# Patient Record
Sex: FEMALE | Race: WHITE | NOT HISPANIC OR LATINO | Employment: OTHER | URBAN - METROPOLITAN AREA
[De-identification: names, ages, dates, MRNs, and addresses within clinical notes are randomized per-mention and may not be internally consistent; named-entity substitution may affect disease eponyms.]

---

## 2017-03-16 RX ORDER — CHOLESTYRAMINE 4 G/5.5G
POWDER, FOR SUSPENSION ORAL
Qty: 30 PACKET | Refills: 1 | Status: SHIPPED | OUTPATIENT
Start: 2017-03-16 | End: 2017-05-17 | Stop reason: SDUPTHER

## 2017-03-16 RX ORDER — DICYCLOMINE HYDROCHLORIDE 10 MG/1
CAPSULE ORAL
Qty: 90 CAPSULE | Refills: 1 | Status: SHIPPED | OUTPATIENT
Start: 2017-03-16 | End: 2017-05-17 | Stop reason: SDUPTHER

## 2017-05-11 RX ORDER — VENLAFAXINE HYDROCHLORIDE 75 MG/1
CAPSULE, EXTENDED RELEASE ORAL
Qty: 90 CAPSULE | Refills: 0 | Status: SHIPPED | OUTPATIENT
Start: 2017-05-11 | End: 2017-08-12 | Stop reason: SDUPTHER

## 2017-05-15 ENCOUNTER — PATIENT OUTREACH (OUTPATIENT)
Dept: ADMINISTRATIVE | Facility: HOSPITAL | Age: 62
End: 2017-05-15
Payer: COMMERCIAL

## 2017-05-15 DIAGNOSIS — Z12.39 BREAST CANCER SCREENING: Primary | ICD-10-CM

## 2017-05-17 ENCOUNTER — OFFICE VISIT (OUTPATIENT)
Dept: FAMILY MEDICINE | Facility: CLINIC | Age: 62
End: 2017-05-17
Payer: COMMERCIAL

## 2017-05-17 ENCOUNTER — LAB VISIT (OUTPATIENT)
Dept: LAB | Facility: HOSPITAL | Age: 62
End: 2017-05-17
Attending: FAMILY MEDICINE
Payer: COMMERCIAL

## 2017-05-17 VITALS
BODY MASS INDEX: 44.99 KG/M2 | WEIGHT: 244.5 LBS | TEMPERATURE: 98 F | SYSTOLIC BLOOD PRESSURE: 122 MMHG | HEART RATE: 80 BPM | DIASTOLIC BLOOD PRESSURE: 78 MMHG | HEIGHT: 62 IN

## 2017-05-17 DIAGNOSIS — E66.01 OBESITY, MORBID, BMI 40.0-49.9: ICD-10-CM

## 2017-05-17 DIAGNOSIS — F33.40 RECURRENT MAJOR DEPRESSIVE DISORDER, IN REMISSION: ICD-10-CM

## 2017-05-17 DIAGNOSIS — M79.7 FIBROMYALGIA: ICD-10-CM

## 2017-05-17 DIAGNOSIS — Z00.00 ROUTINE HISTORY AND PHYSICAL EXAMINATION OF ADULT: ICD-10-CM

## 2017-05-17 DIAGNOSIS — K58.9 IRRITABLE BOWEL SYNDROME WITHOUT DIARRHEA: ICD-10-CM

## 2017-05-17 DIAGNOSIS — Z00.00 ROUTINE HISTORY AND PHYSICAL EXAMINATION OF ADULT: Primary | ICD-10-CM

## 2017-05-17 LAB
ANION GAP SERPL CALC-SCNC: 11 MMOL/L
BUN SERPL-MCNC: 17 MG/DL
CALCIUM SERPL-MCNC: 10 MG/DL
CHLORIDE SERPL-SCNC: 106 MMOL/L
CHOLEST/HDLC SERPL: 3.1 {RATIO}
CO2 SERPL-SCNC: 25 MMOL/L
CREAT SERPL-MCNC: 0.9 MG/DL
EST. GFR  (AFRICAN AMERICAN): >60 ML/MIN/1.73 M^2
EST. GFR  (NON AFRICAN AMERICAN): >60 ML/MIN/1.73 M^2
GLUCOSE SERPL-MCNC: 101 MG/DL
HDL/CHOLESTEROL RATIO: 31.9 %
HDLC SERPL-MCNC: 188 MG/DL
HDLC SERPL-MCNC: 60 MG/DL
LDLC SERPL CALC-MCNC: 104 MG/DL
NONHDLC SERPL-MCNC: 128 MG/DL
POTASSIUM SERPL-SCNC: 4.7 MMOL/L
SODIUM SERPL-SCNC: 142 MMOL/L
TRIGL SERPL-MCNC: 120 MG/DL

## 2017-05-17 PROCEDURE — 36415 COLL VENOUS BLD VENIPUNCTURE: CPT | Mod: PO

## 2017-05-17 PROCEDURE — 80061 LIPID PANEL: CPT

## 2017-05-17 PROCEDURE — 99999 PR PBB SHADOW E&M-EST. PATIENT-LVL III: CPT | Mod: PBBFAC,,, | Performed by: FAMILY MEDICINE

## 2017-05-17 PROCEDURE — 99396 PREV VISIT EST AGE 40-64: CPT | Mod: S$GLB,,, | Performed by: FAMILY MEDICINE

## 2017-05-17 PROCEDURE — 80048 BASIC METABOLIC PNL TOTAL CA: CPT

## 2017-05-17 RX ORDER — DICYCLOMINE HYDROCHLORIDE 10 MG/1
CAPSULE ORAL
Qty: 90 CAPSULE | Refills: 11 | Status: SHIPPED | OUTPATIENT
Start: 2017-05-17 | End: 2018-06-13 | Stop reason: SDUPTHER

## 2017-05-17 NOTE — MR AVS SNAPSHOT
[facility]  [address] [facility] [address]  Phone: [phone]  Fax: [phone]                  Charlene Jeffery   2017 9:00 AM   Office Visit    Description:  Female : 1955   Provider:  Piter Hansen MD   Department:  [facility]           Reason for Visit     checkup           Diagnoses this Visit        Comments    Routine history and physical examination of adult    -  Primary     Irritable bowel syndrome without diarrhea         Obesity, morbid, BMI 40.0-49.9         Recurrent major depressive disorder, in remission         Fibromyalgia                To Do List           Future Appointments        Provider Department Dept Phone    2017 2:25 PM LABORATORY, [place] [facility] [phone]      Goals (5 Years of Data)     None       These Medications        Disp Refills Start End    dicyclomine (BENTYL) 10 MG capsule 90 capsule 11 2017     TAKE ONE CAPSULE BY MOUTH ONCE DAILY BEFORE MEAL(S)    Pharmacy: [facility]  [place] [address] Ph #: [phone]         [facility]     [facility] Nurse Care Line -  Assistance  Unless otherwise directed by your provider, please contact [facility], our nurse care line that is available for  assistance.     Registered nurses in the [facility] provide: appointment scheduling, clinical advisement, health education, and other advisory services.  Call: [phone] (toll free)               Medications           Message regarding Medications     Verify the changes and/or additions to your medication regime listed below are the same as discussed with your clinician today.  If any of these changes or additions are incorrect, please notify your healthcare provider.             Verify that the below list of medications is an accurate representation of the medications you are currently taking.  If none reported, the  "list may be blank. If incorrect, please contact your healthcare provider. Carry this list with you in case of emergency.           Current Medications     cholestyramine-aspartame (PREVALITE) 4 gram PwPk DISSOLVE AND TAKE ONE BY MOUTH ONCE DAILY    dicyclomine (BENTYL) 10 MG capsule TAKE ONE CAPSULE BY MOUTH ONCE DAILY BEFORE MEAL(S)    loperamide (IMODIUM A-D) 2 mg Tab Take 2 mg by mouth 4 (four) times daily as needed.    venlafaxine (EFFEXOR-XR) 75 MG 24 hr capsule TAKE ONE CAPSULE BY MOUTH ONCE DAILY           Clinical Reference Information           Your Vitals Were     BP Pulse Temp Height Weight BMI    122/78 80 98.4 °F (36.9 °C) 5' 2" (1.575 m) 110.9 kg (244 lb 7.8 oz) 44.72 kg/m2      Blood Pressure          Most Recent Value    BP  122/78      Allergies as of 5/17/2017     Venomil Wasp Venom [Venom-wasp]      Immunizations Administered on Date of Encounter - 5/17/2017     None      Orders Placed During Today's Visit     Future Labs/Procedures Expected by Expires    Basic metabolic panel  5/17/2017 1/24/2031    Lipid panel  5/17/2017 (Approximate) 5/17/2018      Language Assistance Services     ATTENTION: Language assistance services are available, free of charge. Please call [phone].      ATENCIÓN: Si adiella theresa, tiene a andrew disposición servicios gratuitos de asistencia lingüística. Llame al [phone].     [facility] Ý: N?u b?n nói Ti?ng Vi?t, có các d?ch v? h? tr? ngôn ng? mi?n phí dành cho b?n. G?i s? [phone].         [facility] complies with applicable Federal civil rights laws and does not discriminate on the basis of race, color, national origin, age, disability, or sex.        "

## 2017-05-17 NOTE — PROGRESS NOTES
Patient presents physical exam.  Follow-up depression-doing well.  No SI/HI.  Doing well with regards to fibromyalgia and irritable bowel syndrome.  She is trying to work on her weight.      Past Medical History:  Past Medical History:   Diagnosis Date    Depression     Diverticulosis 9/23/2008    DJD (degenerative joint disease) of knee     Fibromyalgia     Gambling problem     IBS (irritable bowel syndrome)     Obesity, morbid, BMI 40.0-49.9     S/P colonoscopy 9/23/2008    Sensorineural hearing loss     left ear-Sudden     Past Surgical History:   Procedure Laterality Date    APPENDECTOMY      CHOLECYSTECTOMY      COLONOSCOPY  09/08    repeat 5 yrs    FOOT NEUROMA SURGERY      HEEL SPUR SURGERY      HYSTERECTOMY      Fibroid, endometriosis. Ovaries intact    ROTATOR CUFF REPAIR Right     toe surgeries      Tonsillectomy       Social History     Social History    Marital status: Single     Spouse name: N/A    Number of children: N/A    Years of education: N/A     Occupational History    Not on file.     Social History Main Topics    Smoking status: Never Smoker    Smokeless tobacco: Not on file    Alcohol use Yes      Comment: occasion    Drug use: No    Sexual activity: Not on file     Other Topics Concern    Not on file     Social History Narrative    No narrative on file     Family History   Problem Relation Age of Onset    Diabetes Mother     Diabetes Father     Depression Father     Breast cancer Maternal Aunt      Review of patient's allergies indicates:   Allergen Reactions    Venomil wasp venom [venom-wasp]      Swelling and difficulty breathing     Current Outpatient Prescriptions on File Prior to Visit   Medication Sig Dispense Refill    cholestyramine-aspartame (PREVALITE) 4 gram PwPk DISSOLVE AND TAKE ONE BY MOUTH ONCE DAILY 30 packet 0    loperamide (IMODIUM A-D) 2 mg Tab Take 2 mg by mouth 4 (four) times daily as needed.      venlafaxine (EFFEXOR-XR) 75 MG 24 hr  "capsule TAKE ONE CAPSULE BY MOUTH ONCE DAILY 90 capsule 0    [DISCONTINUED] dicyclomine (BENTYL) 10 MG capsule TAKE ONE CAPSULE BY MOUTH ONCE DAILY BEFORE MEAL(S) 90 capsule 1    [DISCONTINUED] PREVALITE 4 gram PwPk DISSOLVE & TAKE ONE POWDER BY MOUTH ONCE DAILY 30 packet 1     No current facility-administered medications on file prior to visit.            ROS:  GENERAL: No fever, chills,  or significant weight changes.  HEENT: No headache, vision or hearing complaints.  No dysphagia  CHEST: Denies HU, cyanosis, wheezing, cough and sputum production.  CARDIOVASCULAR: Denies chest pain, PND, orthopnea or reduced exercise tolerance.  ABDOMEN: Appetite fine. Denies  hematemesis or blood in stool.  URINARY: No flank pain, dysuria or hematuria.  MUSCULOSKELETAL: No warmth swelling or tenderness of the joints  NEUROLOGIC: No focal weakness numbness or paresthesia  PSYCHIATRIC: Denies SI/HI      OBJECTIVE:     Vitals:    05/17/17 0852   BP: 122/78   Pulse: 80   Temp: 98.4 °F (36.9 °C)   Weight: 110.9 kg (244 lb 7.8 oz)   Height: 5' 2" (1.575 m)     Wt Readings from Last 3 Encounters:   05/17/17 110.9 kg (244 lb 7.8 oz)   09/08/16 115.5 kg (254 lb 10.1 oz)   04/27/16 113.1 kg (249 lb 6.4 oz)     APPEARANCE: Well nourished, well developed, in no acute distress.    HEAD: Normocephalic.  Atraumatic.  No sinus tenderness.  EYES:   Right eye: Pupil reactive.  Conjunctiva clear.    Left eye: Pupil reactive.  Conjunctiva clear.    Both fundi:  Grossly normal to nondilated exam. EOMI.    EARS: TM's intact. Light reflex normal. No retraction or perforation.    NOSE:  clear.  MOUTH & THROAT:  No pharyngeal erythema or exudate. No lesions.  NECK: Supple. No bruits.  No JVD.  No cervical lymphadenopathy.  No thyromegaly.    CHEST: Breath sounds clear bilaterally.  Normal respiratory effort  CARDIOVASCULAR: Normal rate.  Regular rhythm.  No murmurs.  No rub.  No gallops.  ABDOMEN: Bowel sounds normal.  Soft.  No tenderness.  No " organomegaly.  PERIPHERAL VASCULAR: No cyanosis.  No clubbing.  No edema.  NEUROLOGIC: No focal findings.  MENTAL STATUS: Alert.  Oriented x 3.          Charlene was seen today for checkup.    Diagnoses and all orders for this visit:    Routine history and physical examination of adult  -     Basic metabolic panel; Future  -     Lipid panel; Future    Irritable bowel syndrome without diarrhea    Obesity, morbid, BMI 40.0-49.9    Recurrent major depressive disorder, in remission    Fibromyalgia    Other orders  -     dicyclomine (BENTYL) 10 MG capsule; TAKE ONE CAPSULE BY MOUTH ONCE DAILY BEFORE MEAL(S)     meds   Anticipatory guidance: Don't smoke.  Healthy diet and regular exercise recommended.

## 2017-07-12 RX ORDER — CHOLESTYRAMINE 4 G/5.5G
POWDER, FOR SUSPENSION ORAL
Qty: 30 PACKET | Refills: 11 | Status: SHIPPED | OUTPATIENT
Start: 2017-07-12 | End: 2018-06-13

## 2017-08-14 RX ORDER — VENLAFAXINE HYDROCHLORIDE 75 MG/1
CAPSULE, EXTENDED RELEASE ORAL
Qty: 90 CAPSULE | Refills: 2 | Status: SHIPPED | OUTPATIENT
Start: 2017-08-14 | End: 2018-05-09 | Stop reason: SDUPTHER

## 2018-05-09 RX ORDER — VENLAFAXINE HYDROCHLORIDE 75 MG/1
CAPSULE, EXTENDED RELEASE ORAL
Qty: 30 CAPSULE | Refills: 0 | Status: SHIPPED | OUTPATIENT
Start: 2018-05-09 | End: 2018-06-13 | Stop reason: SDUPTHER

## 2018-06-07 RX ORDER — VENLAFAXINE HYDROCHLORIDE 75 MG/1
CAPSULE, EXTENDED RELEASE ORAL
Qty: 30 CAPSULE | Refills: 0 | OUTPATIENT
Start: 2018-06-07

## 2018-06-07 RX ORDER — DICYCLOMINE HYDROCHLORIDE 10 MG/1
CAPSULE ORAL
Qty: 90 CAPSULE | Refills: 11 | OUTPATIENT
Start: 2018-06-07

## 2018-06-09 RX ORDER — VENLAFAXINE HYDROCHLORIDE 75 MG/1
CAPSULE, EXTENDED RELEASE ORAL
Qty: 30 CAPSULE | Refills: 0 | OUTPATIENT
Start: 2018-06-09

## 2018-06-13 ENCOUNTER — OFFICE VISIT (OUTPATIENT)
Dept: FAMILY MEDICINE | Facility: CLINIC | Age: 63
End: 2018-06-13
Payer: COMMERCIAL

## 2018-06-13 ENCOUNTER — HOSPITAL ENCOUNTER (OUTPATIENT)
Dept: RADIOLOGY | Facility: HOSPITAL | Age: 63
Discharge: HOME | End: 2018-06-13
Attending: FAMILY MEDICINE
Payer: COMMERCIAL

## 2018-06-13 VITALS
BODY MASS INDEX: 45.08 KG/M2 | SYSTOLIC BLOOD PRESSURE: 120 MMHG | WEIGHT: 245 LBS | TEMPERATURE: 98 F | HEIGHT: 62 IN | HEART RATE: 80 BPM | DIASTOLIC BLOOD PRESSURE: 80 MMHG

## 2018-06-13 VITALS — WEIGHT: 245 LBS | HEIGHT: 62 IN | BODY MASS INDEX: 45.08 KG/M2

## 2018-06-13 DIAGNOSIS — Z00.00 ROUTINE HISTORY AND PHYSICAL EXAMINATION OF ADULT: Primary | ICD-10-CM

## 2018-06-13 DIAGNOSIS — Z00.00 ROUTINE HISTORY AND PHYSICAL EXAMINATION OF ADULT: ICD-10-CM

## 2018-06-13 DIAGNOSIS — K58.0 IRRITABLE BOWEL SYNDROME WITH DIARRHEA: ICD-10-CM

## 2018-06-13 DIAGNOSIS — F33.40 RECURRENT MAJOR DEPRESSIVE DISORDER, IN REMISSION: ICD-10-CM

## 2018-06-13 DIAGNOSIS — E66.01 OBESITY, MORBID, BMI 40.0-49.9: ICD-10-CM

## 2018-06-13 PROCEDURE — 99999 PR PBB SHADOW E&M-EST. PATIENT-LVL III: CPT | Mod: PBBFAC,,, | Performed by: FAMILY MEDICINE

## 2018-06-13 PROCEDURE — 90715 TDAP VACCINE 7 YRS/> IM: CPT | Mod: S$GLB,,, | Performed by: FAMILY MEDICINE

## 2018-06-13 PROCEDURE — 77067 SCR MAMMO BI INCL CAD: CPT | Mod: TC,PO

## 2018-06-13 PROCEDURE — 90471 IMMUNIZATION ADMIN: CPT | Mod: S$GLB,,, | Performed by: FAMILY MEDICINE

## 2018-06-13 PROCEDURE — 99396 PREV VISIT EST AGE 40-64: CPT | Mod: 25,S$GLB,, | Performed by: FAMILY MEDICINE

## 2018-06-13 PROCEDURE — 77067 SCR MAMMO BI INCL CAD: CPT | Mod: 26,,, | Performed by: RADIOLOGY

## 2018-06-13 PROCEDURE — 77063 BREAST TOMOSYNTHESIS BI: CPT | Mod: 26,,, | Performed by: RADIOLOGY

## 2018-06-13 RX ORDER — VENLAFAXINE HYDROCHLORIDE 75 MG/1
75 CAPSULE, EXTENDED RELEASE ORAL DAILY
Qty: 30 CAPSULE | Refills: 11 | Status: SHIPPED | OUTPATIENT
Start: 2018-06-13 | End: 2019-05-24 | Stop reason: SDUPTHER

## 2018-06-13 RX ORDER — DICYCLOMINE HYDROCHLORIDE 10 MG/1
CAPSULE ORAL
Qty: 90 CAPSULE | Refills: 11 | Status: SHIPPED | OUTPATIENT
Start: 2018-06-13 | End: 2019-05-24 | Stop reason: SDUPTHER

## 2018-06-13 NOTE — PROGRESS NOTES
Patient presents physical exam.  Follow-up depression-doing well.  No SI/HI.  Doing well with regards to fibromyalgia and irritable bowel syndrome.  She is trying to work on her weight.      Past Medical History:  Past Medical History:   Diagnosis Date    Depression     Diverticulosis 9/23/2008    DJD (degenerative joint disease) of knee     Fibromyalgia     Gambling problem     IBS (irritable bowel syndrome)     Obesity, morbid, BMI 40.0-49.9     S/P colonoscopy 9/23/2008    Sensorineural hearing loss     left ear-Sudden     Past Surgical History:   Procedure Laterality Date    APPENDECTOMY      CHOLECYSTECTOMY      COLONOSCOPY  09/08    repeat 5 yrs    FOOT NEUROMA SURGERY      HEEL SPUR SURGERY      HYSTERECTOMY      Fibroid, endometriosis. Ovaries intact    ROTATOR CUFF REPAIR Right     toe surgeries      Tonsillectomy       Social History     Social History    Marital status: Single     Spouse name: N/A    Number of children: N/A    Years of education: N/A     Occupational History    Not on file.     Social History Main Topics    Smoking status: Never Smoker    Smokeless tobacco: Not on file    Alcohol use Yes      Comment: occasion    Drug use: No    Sexual activity: Not on file     Other Topics Concern    Not on file     Social History Narrative    No narrative on file     Family History   Problem Relation Age of Onset    Diabetes Mother     Diabetes Father     Depression Father     Breast cancer Maternal Aunt      Review of patient's allergies indicates:   Allergen Reactions    Venomil wasp venom [venom-wasp]      Swelling and difficulty breathing     Current Outpatient Prescriptions on File Prior to Visit   Medication Sig Dispense Refill    cholestyramine-aspartame (PREVALITE) 4 gram PwPk DISSOLVE AND TAKE ONE BY MOUTH ONCE DAILY 30 packet 0    [DISCONTINUED] dicyclomine (BENTYL) 10 MG capsule TAKE ONE CAPSULE BY MOUTH ONCE DAILY BEFORE MEAL(S) 90 capsule 11     "[DISCONTINUED] venlafaxine (EFFEXOR-XR) 75 MG 24 hr capsule TAKE ONE CAPSULE BY MOUTH ONCE DAILY 30 capsule 0    [DISCONTINUED] loperamide (IMODIUM A-D) 2 mg Tab Take 2 mg by mouth 4 (four) times daily as needed.      [DISCONTINUED] PREVALITE 4 gram PwPk DISSOLVE & TAKE ONE POWDER BY MOUTH ONCE DAILY 30 packet 11     No current facility-administered medications on file prior to visit.            ROS:  GENERAL: No fever, chills,  or significant weight changes.  HEENT: No headache, vision or hearing complaints.  No dysphagia  CHEST: Denies HU, cyanosis, wheezing, cough and sputum production.  CARDIOVASCULAR: Denies chest pain, PND, orthopnea or reduced exercise tolerance.  ABDOMEN: Appetite fine. Denies  hematemesis or blood in stool.  URINARY: No flank pain, dysuria or hematuria.  MUSCULOSKELETAL: No warmth swelling or tenderness of the joints  NEUROLOGIC: No focal weakness numbness or paresthesia  PSYCHIATRIC: Denies SI/HI      OBJECTIVE:     Vitals:    06/13/18 0929   BP: 120/80   Pulse: 80   Temp: 98.2 °F (36.8 °C)   Weight: 111.1 kg (245 lb)   Height: 5' 2" (1.575 m)     Wt Readings from Last 3 Encounters:   06/13/18 111.1 kg (245 lb)   06/13/18 111.1 kg (245 lb)   05/17/17 110.9 kg (244 lb 7.8 oz)     APPEARANCE: Well nourished, well developed, in no acute distress.    HEAD: Normocephalic.  Atraumatic.  No sinus tenderness.  EYES:   Right eye: Pupil reactive.  Conjunctiva clear.    Left eye: Pupil reactive.  Conjunctiva clear.    Both fundi:  Grossly normal to nondilated exam. EOMI.    EARS: TM's intact. Light reflex normal. No retraction or perforation.    NOSE:  clear.  MOUTH & THROAT:  No pharyngeal erythema or exudate. No lesions.  NECK: Supple. No bruits.  No JVD.  No cervical lymphadenopathy.  No thyromegaly.    CHEST: Breath sounds clear bilaterally.  Normal respiratory effort  CARDIOVASCULAR: Normal rate.  Regular rhythm.  No murmurs.  No rub.  No gallops.  ABDOMEN: Bowel sounds normal.  Soft.  No " tenderness.  No organomegaly.  PERIPHERAL VASCULAR: No cyanosis.  No clubbing.  No edema.  NEUROLOGIC: No focal findings.  MENTAL STATUS: Alert.  Oriented x 3.          Charlene was seen today for annual exam.    Diagnoses and all orders for this visit:    Routine history and physical examination of adult  -     Cancel: Mammo Digital Screening Bilat with CAD; Future  -     Comprehensive metabolic panel; Future    Irritable bowel syndrome with diarrhea  -     Comprehensive metabolic panel; Future    Obesity, morbid, BMI 40.0-49.9    Recurrent major depressive disorder, in remission    Other orders  -     venlafaxine (EFFEXOR-XR) 75 MG 24 hr capsule; Take 1 capsule (75 mg total) by mouth once daily.  -     dicyclomine (BENTYL) 10 MG capsule; TAKE ONE CAPSULE BY MOUTH ONCE DAILY BEFORE MEAL(S)  -     Tdap Vaccine; Future  -     Tdap Vaccine     meds   Anticipatory guidance: Don't smoke.  Healthy diet and regular exercise recommended.

## 2018-06-13 NOTE — LETTER
June 13, 2018                 [facility]  [address] [facility] [address]  Phone: [phone]  Fax: [phone] June 13, 2018     Patient: Charlene Jeffery    YOB: 1955   Date of Visit: 6/13/2018       To Whom It May Concern:    It is my medical opinion that Charlene Jeffery  has a medical condition which may require sudden need to use the restroom. Please allow her to use the facilities.  If you have any questions or concerns, please don't hesitate to call.    Sincerely,          Piter Hansen MD

## 2018-08-03 ENCOUNTER — TELEPHONE (OUTPATIENT)
Dept: FAMILY MEDICINE | Facility: CLINIC | Age: 63
End: 2018-08-03

## 2018-08-03 NOTE — TELEPHONE ENCOUNTER
Pt states she received a letter to consult with her DrRosa to switch Prevalite to a preferred drug. Cholestyramine/Aspartame powder was suggested.

## 2018-08-03 NOTE — TELEPHONE ENCOUNTER
----- Message from Yaritza Brooke sent at 8/3/2018  4:11 PM CDT -----  Patient got letter in mail about a proscription change cholestyramine 4 gm please contact

## 2018-08-06 RX ORDER — CHOLESTYRAMINE 4 G/4.8G
1 POWDER, FOR SUSPENSION ORAL DAILY
Qty: 30 PACKET | Refills: 3 | Status: SHIPPED | OUTPATIENT
Start: 2018-08-06 | End: 2019-05-14 | Stop reason: SDUPTHER

## 2018-08-06 NOTE — TELEPHONE ENCOUNTER
I sent a prescription again.  This was not sent as a dispense as written.  Any generic equivalent is fine.

## 2018-08-08 ENCOUNTER — TELEPHONE (OUTPATIENT)
Dept: FAMILY MEDICINE | Facility: CLINIC | Age: 63
End: 2018-08-08

## 2018-08-08 NOTE — TELEPHONE ENCOUNTER
As far as I am aware Prevalite is just a brand name for the cholestyramine.  It is fine to substitute.

## 2018-08-08 NOTE — TELEPHONE ENCOUNTER
Per pharmacist cholestyramine no longer available, they can substitute prevalite, which is similar, please advise, ok to call back tomorrow.

## 2018-08-08 NOTE — TELEPHONE ENCOUNTER
----- Message from Pedrito Lee sent at 8/8/2018  4:11 PM CDT -----  Contact: alan ([facility] pharmacy )   Calling to get pharmacy authoriz.         [phone]

## 2018-11-07 ENCOUNTER — PATIENT OUTREACH (OUTPATIENT)
Dept: ADMINISTRATIVE | Facility: HOSPITAL | Age: 63
End: 2018-11-07

## 2018-11-07 NOTE — PROGRESS NOTES
Flu shot given 10/31/18.  Immunization and HM updated. Information sent to scanning.   Health Maintenance reviewed.

## 2019-05-14 RX ORDER — CHOLESTYRAMINE 4 G/5.5G
POWDER, FOR SUSPENSION ORAL
Qty: 30 PACKET | Refills: 0 | Status: SHIPPED | OUTPATIENT
Start: 2019-05-14 | End: 2019-05-24 | Stop reason: SDUPTHER

## 2019-05-24 ENCOUNTER — TELEPHONE (OUTPATIENT)
Dept: ORTHOPEDICS | Facility: CLINIC | Age: 64
End: 2019-05-24

## 2019-05-24 ENCOUNTER — HOSPITAL ENCOUNTER (OUTPATIENT)
Dept: RADIOLOGY | Facility: HOSPITAL | Age: 64
Discharge: HOME | End: 2019-05-24
Attending: FAMILY MEDICINE
Payer: COMMERCIAL

## 2019-05-24 ENCOUNTER — OFFICE VISIT (OUTPATIENT)
Dept: FAMILY MEDICINE | Facility: CLINIC | Age: 64
End: 2019-05-24
Payer: COMMERCIAL

## 2019-05-24 VITALS
BODY MASS INDEX: 44.9 KG/M2 | HEIGHT: 62 IN | SYSTOLIC BLOOD PRESSURE: 120 MMHG | HEART RATE: 80 BPM | WEIGHT: 244 LBS | TEMPERATURE: 98 F | DIASTOLIC BLOOD PRESSURE: 80 MMHG

## 2019-05-24 DIAGNOSIS — E66.01 OBESITY, MORBID, BMI 40.0-49.9: ICD-10-CM

## 2019-05-24 DIAGNOSIS — M17.11 PRIMARY OSTEOARTHRITIS OF RIGHT KNEE: ICD-10-CM

## 2019-05-24 DIAGNOSIS — Z12.11 SCREENING FOR COLON CANCER: ICD-10-CM

## 2019-05-24 DIAGNOSIS — Z00.00 ROUTINE HISTORY AND PHYSICAL EXAMINATION OF ADULT: Primary | ICD-10-CM

## 2019-05-24 DIAGNOSIS — F33.40 RECURRENT MAJOR DEPRESSIVE DISORDER, IN REMISSION: ICD-10-CM

## 2019-05-24 DIAGNOSIS — K58.0 IRRITABLE BOWEL SYNDROME WITH DIARRHEA: ICD-10-CM

## 2019-05-24 PROCEDURE — 73560 X-RAY EXAM OF KNEE 1 OR 2: CPT | Mod: 26,59,RT, | Performed by: RADIOLOGY

## 2019-05-24 PROCEDURE — 99999 PR PBB SHADOW E&M-EST. PATIENT-LVL III: CPT | Mod: PBBFAC,,, | Performed by: FAMILY MEDICINE

## 2019-05-24 PROCEDURE — 99999 PR PBB SHADOW E&M-EST. PATIENT-LVL III: ICD-10-PCS | Mod: PBBFAC,,, | Performed by: FAMILY MEDICINE

## 2019-05-24 PROCEDURE — 73562 X-RAY EXAM OF KNEE 3: CPT | Mod: 26,RT,, | Performed by: RADIOLOGY

## 2019-05-24 PROCEDURE — 99396 PR PREVENTIVE VISIT,EST,40-64: ICD-10-PCS | Mod: S$GLB,,, | Performed by: FAMILY MEDICINE

## 2019-05-24 PROCEDURE — 73560 XR KNEE ORTHO RIGHT: ICD-10-PCS | Mod: 26,59,RT, | Performed by: RADIOLOGY

## 2019-05-24 PROCEDURE — 73562 XR KNEE ORTHO RIGHT: ICD-10-PCS | Mod: 26,RT,, | Performed by: RADIOLOGY

## 2019-05-24 PROCEDURE — 99396 PREV VISIT EST AGE 40-64: CPT | Mod: S$GLB,,, | Performed by: FAMILY MEDICINE

## 2019-05-24 PROCEDURE — 73562 X-RAY EXAM OF KNEE 3: CPT | Mod: TC,PO,RT

## 2019-05-24 PROCEDURE — 73560 X-RAY EXAM OF KNEE 1 OR 2: CPT | Mod: TC,PO,RT

## 2019-05-24 RX ORDER — CHOLESTYRAMINE 4 G/4.8G
1 POWDER, FOR SUSPENSION ORAL DAILY
Qty: 90 PACKET | Refills: 3 | Status: SHIPPED | OUTPATIENT
Start: 2019-05-24 | End: 2020-06-30

## 2019-05-24 RX ORDER — CHOLESTYRAMINE 4 G/5.5G
POWDER, FOR SUSPENSION ORAL
Qty: 90 PACKET | Refills: 3 | Status: CANCELLED | OUTPATIENT
Start: 2019-05-24

## 2019-05-24 RX ORDER — VENLAFAXINE HYDROCHLORIDE 75 MG/1
75 CAPSULE, EXTENDED RELEASE ORAL DAILY
Qty: 90 CAPSULE | Refills: 3 | Status: SHIPPED | OUTPATIENT
Start: 2019-05-24 | End: 2020-06-30

## 2019-05-24 RX ORDER — DICYCLOMINE HYDROCHLORIDE 10 MG/1
CAPSULE ORAL
Qty: 270 CAPSULE | Refills: 3 | Status: SHIPPED | OUTPATIENT
Start: 2019-05-24 | End: 2020-07-29 | Stop reason: SDUPTHER

## 2019-05-24 NOTE — TELEPHONE ENCOUNTER
Called patient and scheduled patient appointment at her convenience. Confirmed date time and location.

## 2019-05-24 NOTE — PROGRESS NOTES
Patient presents physical exam.  Follow-up depression-doing well.  No SI/HI.  Doing well with regards to fibromyalgia and irritable bowel syndrome.  She is trying to work on her weight.    She does complain of some chronic intermittent knee pain.  Previous arthritis.  Previous viscosupplementation.  She had a flare last week and did have some swelling at the right knee.  Occasionally feels like it locks.  Current treatment naproxen.    Past Medical History:  Past Medical History:   Diagnosis Date    Depression     Diverticulosis 9/23/2008    DJD (degenerative joint disease) of knee     Fibromyalgia     Gambling problem     IBS (irritable bowel syndrome)     Obesity, morbid, BMI 40.0-49.9     S/P colonoscopy 9/23/2008    Sensorineural hearing loss     left ear-Sudden     Past Surgical History:   Procedure Laterality Date    APPENDECTOMY      CHOLECYSTECTOMY      COLONOSCOPY  09/08    repeat 5 yrs    ESOPHAGOGASTRODUODENOSCOPY (EGD) N/A 3/18/2015    Performed [facility]    FOOT NEUROMA SURGERY      HEEL SPUR SURGERY      HYSTERECTOMY      Fibroid, endometriosis. Ovaries intact    ROTATOR CUFF REPAIR Right     toe surgeries      Tonsillectomy       Social History     Socioeconomic History    Marital status: Single     Spouse name: Not on file    Number of children: Not on file    Years of education: Not on file    Highest education level: Not on file   Occupational History    Not on file   Social Needs    Financial resource strain: Not on file    Food insecurity:     Worry: Not on file     Inability: Not on file    Transportation needs:     Medical: Not on file     Non-medical: Not on file   Tobacco Use    Smoking status: Never Smoker   Substance and Sexual Activity    Alcohol use: Yes     Comment: occasion    Drug use: No    Sexual activity: Not on file   Lifestyle    Physical activity:     Days per week: Not on file     Minutes per session: Not on file     Stress: Not on file   Relationships    Social connections:     Talks on phone: Not on file     Gets together: Not on file     Attends Church service: Not on file     Active member of club or organization: Not on file     Attends meetings of clubs or organizations: Not on file     Relationship status: Not on file   Other Topics Concern    Not on file   Social History Narrative    Not on file     Family History   Problem Relation Age of Onset    Diabetes Mother     Diabetes Father     Depression Father     Breast cancer Maternal Aunt      Review of patient's allergies indicates:   Allergen Reactions    Venomil wasp venom [venom-wasp]      Swelling and difficulty breathing     Current Outpatient Medications on File Prior to Visit   Medication Sig Dispense Refill    Lactobac no.41/Bifidobact no.7 (PROBIOTIC-10 ORAL) Take 1 capsule by mouth once daily.      naproxen (NAPROSYN ORAL) Take 440 mg by mouth 2 (two) times daily as needed.      [DISCONTINUED] dicyclomine (BENTYL) 10 MG capsule TAKE ONE CAPSULE BY MOUTH ONCE DAILY BEFORE MEAL(S) 90 capsule 11    [DISCONTINUED] PREVALITE 4 gram PwPk DISSOLVE & MIX 1 PACKET IN WATER THEN TAKE BY MOUTH ONCE DAILY 30 packet 0    [DISCONTINUED] venlafaxine (EFFEXOR-XR) 75 MG 24 hr capsule Take 1 capsule (75 mg total) by mouth once daily. 30 capsule 11     No current facility-administered medications on file prior to visit.            ROS:  GENERAL: No fever, chills,  or significant weight changes.  HEENT: No headache, vision or hearing complaints.  No dysphagia  CHEST: Denies HU, cyanosis, wheezing, cough and sputum production.  CARDIOVASCULAR: Denies chest pain, PND, orthopnea or reduced exercise tolerance.  ABDOMEN: Appetite fine. Denies  hematemesis or blood in stool.  URINARY: No flank pain, dysuria or hematuria.  MUSCULOSKELETAL:  As above  NEUROLOGIC: No focal weakness numbness or paresthesia  PSYCHIATRIC: Denies SI/HI      OBJECTIVE:     Vitals:    05/24/19  "0903   BP: 120/80   Pulse: 80   Temp: 98.1 °F (36.7 °C)   Weight: 110.7 kg (244 lb)   Height: 5' 2" (1.575 m)     Wt Readings from Last 3 Encounters:   05/24/19 110.7 kg (244 lb)   06/13/18 111.1 kg (245 lb)   06/13/18 111.1 kg (245 lb)     APPEARANCE: Well nourished, well developed, in no acute distress.    HEAD: Normocephalic.  Atraumatic.  No sinus tenderness.  EYES:   Right eye: Pupil reactive.  Conjunctiva clear.    Left eye: Pupil reactive.  Conjunctiva clear.    Both fundi:  Grossly normal to nondilated exam. EOMI.    EARS: TM's intact. Light reflex normal. No retraction or perforation.    NOSE:  clear.  MOUTH & THROAT:  No pharyngeal erythema or exudate. No lesions.  NECK: Supple. No bruits.  No JVD.  No cervical lymphadenopathy.  No thyromegaly.    CHEST: Breath sounds clear bilaterally.  Normal respiratory effort  CARDIOVASCULAR: Normal rate.  Regular rhythm.  No murmurs.  No rub.  No gallops.  ABDOMEN: Bowel sounds normal.  Soft.  No tenderness.  No organomegaly.  PERIPHERAL VASCULAR: No cyanosis.  No clubbing.  No edema.  NEUROLOGIC: No focal findings.  MENTAL STATUS: Alert.  Oriented x 3.  The right knee has full range of motion.  She has some tenderness palpation more the medial joint line.  No obvious effusion.  Negative Lachman's and Ayad's.  She does walk with a slight limp.        Charlene was seen today for knee pain.    Diagnoses and all orders for this visit:    Routine history and physical examination of adult  -     Basic metabolic panel; Future    Primary osteoarthritis of right knee  -     X-ray Knee Ortho Right; Future  -     Ambulatory referral to Orthopedics    Recurrent major depressive disorder, in remission    Obesity, morbid, BMI 40.0-49.9    Irritable bowel syndrome with diarrhea    Screening for colon cancer    Other orders  -     Cancel: PREVALITE 4 gram PwPk; DISSOLVE & MIX 1 PACKET IN WATER THEN TAKE BY MOUTH ONCE DAILY  -     cholestyramine-aspartame (PREVALITE) 4 gram PwPk; " Take 1 packet (4 g total) by mouth once daily. Mix in water  -     venlafaxine (EFFEXOR-XR) 75 MG 24 hr capsule; Take 1 capsule (75 mg total) by mouth once daily.  -     dicyclomine (BENTYL) 10 MG capsule; TAKE ONE CAPSULE BY MOUTH ONCE DAILY BEFORE MEAL(S)    RF meds  Check cologuard   Anticipatory guidance: Don't smoke.  Healthy diet and regular exercise recommended.  She may continue the over-the-counter naproxen.

## 2019-06-03 ENCOUNTER — OFFICE VISIT (OUTPATIENT)
Dept: ORTHOPEDICS | Facility: CLINIC | Age: 64
End: 2019-06-03
Payer: COMMERCIAL

## 2019-06-03 VITALS — BODY MASS INDEX: 44.9 KG/M2 | HEIGHT: 62 IN | WEIGHT: 244 LBS

## 2019-06-03 DIAGNOSIS — G89.29 CHRONIC PAIN OF RIGHT KNEE: ICD-10-CM

## 2019-06-03 DIAGNOSIS — M17.11 PRIMARY OSTEOARTHRITIS OF RIGHT KNEE: ICD-10-CM

## 2019-06-03 DIAGNOSIS — M25.561 CHRONIC PAIN OF RIGHT KNEE: ICD-10-CM

## 2019-06-03 PROCEDURE — 3008F BODY MASS INDEX DOCD: CPT | Mod: CPTII,S$GLB,, | Performed by: ORTHOPAEDIC SURGERY

## 2019-06-03 PROCEDURE — 99999 PR PBB SHADOW E&M-EST. PATIENT-LVL II: ICD-10-PCS | Mod: PBBFAC,,, | Performed by: ORTHOPAEDIC SURGERY

## 2019-06-03 PROCEDURE — 99203 PR OFFICE/OUTPT VISIT, NEW, LEVL III, 30-44 MIN: ICD-10-PCS | Mod: S$GLB,,, | Performed by: ORTHOPAEDIC SURGERY

## 2019-06-03 PROCEDURE — 3008F PR BODY MASS INDEX (BMI) DOCUMENTED: ICD-10-PCS | Mod: CPTII,S$GLB,, | Performed by: ORTHOPAEDIC SURGERY

## 2019-06-03 PROCEDURE — 99203 OFFICE O/P NEW LOW 30 MIN: CPT | Mod: S$GLB,,, | Performed by: ORTHOPAEDIC SURGERY

## 2019-06-03 PROCEDURE — 99999 PR PBB SHADOW E&M-EST. PATIENT-LVL II: CPT | Mod: PBBFAC,,, | Performed by: ORTHOPAEDIC SURGERY

## 2019-06-03 NOTE — PROGRESS NOTES
[age] years old, right knee pain for a couple of weeks' time, 2 out of 10   on good days, 5 out of 10 on bad days.  Had similar symptoms in the years past,   but responded well to viscosupplementation.  She is requesting this today.  She   does report an old injury to her knees several decades ago while doing a   gymnastic maneuver.    PHYSICAL EXAMINATION:  MUSCULOSKELETAL:  Exam shows tenderness at the joint line without signs of   infection.  Skin is intact.  Compartments are soft.    IMAGING:  X-rays show arthritic changes.    ASSESSMENT:  Right knee arthrosis.    PLAN:  The patient would like to start a viscosupplementation series and we will   get this done into her right knee once it is approved by insurance.      PBB/HN  dd: 06/03/2019 11:41:07 (CDT)  td: 06/03/2019 23:14:37 (CDT)  Doc ID   #[id number]  Job ID #[id number]    CC:     Further History  Aching pain  Worse with activity  Relieved with rest  No other associated symptoms  No other radiation    Further Exam  Alert and oriented  Pleasant  Contralateral limb has appropriate range of motion for age and condition  Contralateral limb has appropriate strength for age and condition  Contralateral limb has appropriate stability  for age and condition  No adenopathy  Pulses are appropriate for current condition  Skin is intact        Chief Complaint    Chief Complaint   Patient presents with    Right Knee - Pain       HPI  Charlene Jeffery is a 64 y.o.  female who presents with       Past Medical History  Past Medical History:   Diagnosis Date    Depression     Diverticulosis 9/23/2008    DJD (degenerative joint disease) of knee     Fibromyalgia     Gambling problem     IBS (irritable bowel syndrome)     Obesity, morbid, BMI 40.0-49.9     S/P colonoscopy 9/23/2008    Sensorineural hearing loss     left ear-Sudden       Past Surgical History  Past Surgical History:   Procedure Laterality Date    APPENDECTOMY      CHOLECYSTECTOMY      COLONOSCOPY   09/08    repeat 5 yrs    ESOPHAGOGASTRODUODENOSCOPY (EGD) N/A 3/18/2015    Performed by Shahbaz [facility]av[facility]ria [facility] at [facility]    FOOT NEUROMA SURGERY      HEEL SPUR SURGERY      HYSTERECTOMY      Fibroid, endometriosis. Ovaries intact    ROTATOR CUFF REPAIR Right     toe surgeries      Tonsillectomy         Medications  Current Outpatient Medications   Medication Sig    cholestyramine-aspartame (PREVALITE) 4 gram PwPk Take 1 packet (4 g total) by mouth once daily. Mix in water    dicyclomine (BENTYL) 10 MG capsule TAKE ONE CAPSULE BY MOUTH ONCE DAILY BEFORE MEAL(S)    Lactobac no.41/Bifidobact no.7 (PROBIOTIC-10 ORAL) Take 1 capsule by mouth once daily.    naproxen (NAPROSYN ORAL) Take 440 mg by mouth 2 (two) times daily as needed.    venlafaxine (EFFEXOR-XR) 75 MG 24 hr capsule Take 1 capsule (75 mg total) by mouth once daily.     No current facility-administered medications for this visit.        Allergies  Review of patient's allergies indicates:   Allergen Reactions    Venomil wasp venom [venom-wasp]      Swelling and difficulty breathing       Family History  Family History   Problem Relation Age of Onset    Diabetes Mother     Diabetes Father     Depression Father     Breast cancer Maternal Aunt        Social History  Social History     Socioeconomic History    Marital status: Single     Spouse name: Not on file    Number of children: Not on file    Years of education: Not on file    Highest education level: Not on file   Occupational History    Not on file   Social Needs    Financial resource strain: Not on file    Food insecurity:     Worry: Not on file     Inability: Not on file    Transportation needs:     Medical: Not on file     Non-medical: Not on file   Tobacco Use    Smoking status: Never Smoker   Substance and Sexual Activity    Alcohol use: Yes     Comment: occasion    Drug use: No    Sexual activity: Not on file   Lifestyle    Physical activity:     Days per week: Not on  file     Minutes per session: Not on file    Stress: Not on file   Relationships    Social connections:     Talks on phone: Not on file     Gets together: Not on file     Attends Christian service: Not on file     Active member of club or organization: Not on file     Attends meetings of clubs or organizations: Not on file     Relationship status: Not on file   Other Topics Concern    Not on file   Social History Narrative    Not on file               Review of Systems     Constitutional: Negative    HENT: Negative  Eyes: Negative  Respiratory: Negative  Cardiovascular: Negative  Musculoskeletal: HPI  Skin: Negative  Neurological: Negative  Hematological: Negative  Endocrine: Negative                 Physical Exam    There were no vitals filed for this visit.  Body mass index is 44.63 kg/m².  Physical Examination:     General appearance -  well appearing, and in no distress  Mental status - awake  Neck - supple  Chest -  symmetric air entry  Heart - normal rate   Abdomen - soft      Assessment     1. Chronic pain of right knee    2. Primary osteoarthritis of right knee          Plan

## 2019-06-03 NOTE — LETTER
Tara 3, 2019      Edgar Hansen  [address]  [address]  [place] [address]           [facility]  [address] [facility] [address] [place] [address]  Phone: [phone]          Patient: Charlene Jeffery   MR Number: [id number]   YOB: 1955   Date of Visit: 6/3/2019       Dear Edgar Hansen:    Thank you for referring Charlene Jeffery to me for evaluation. Attached you will find relevant portions of my assessment and plan of care.    If you have questions, please do not hesitate to call me. I look forward to following Charlene Jeffery along with you.    Sincerely,    Dannie Jones MD    Enclosure  CC:  No Recipients    If you would like to receive this communication electronically, please contact [email or web address] or [phone] to request more information on [employer] access.    For providers and/or their staff who would like to refer a patient to [facility], please contact us through our one-stop-shop provider referral line, [facility] , at [phone].    If you feel you have received this communication in error or would no longer like to receive these types of communications, please e-mail [email or web address]

## 2019-06-05 ENCOUNTER — TELEPHONE (OUTPATIENT)
Dept: ORTHOPEDICS | Facility: CLINIC | Age: 64
End: 2019-06-05

## 2019-06-05 NOTE — TELEPHONE ENCOUNTER
Called patient to notify patient that her insurance company is not approving her Euflexxa. She stated that she had Synvisc injections in 2011 and they worked and she just now started having trouble again. Notified patient that the referral team has been notified and that I would let her know something as soon as I know it.

## 2019-06-07 ENCOUNTER — OFFICE VISIT (OUTPATIENT)
Dept: ORTHOPEDICS | Facility: CLINIC | Age: 64
End: 2019-06-07
Payer: COMMERCIAL

## 2019-06-07 ENCOUNTER — TELEPHONE (OUTPATIENT)
Dept: ORTHOPEDICS | Facility: CLINIC | Age: 64
End: 2019-06-07

## 2019-06-07 VITALS — WEIGHT: 244 LBS | BODY MASS INDEX: 44.9 KG/M2 | HEIGHT: 62 IN

## 2019-06-07 DIAGNOSIS — M17.11 PRIMARY OSTEOARTHRITIS OF RIGHT KNEE: ICD-10-CM

## 2019-06-07 DIAGNOSIS — M25.561 CHRONIC PAIN OF RIGHT KNEE: Primary | ICD-10-CM

## 2019-06-07 DIAGNOSIS — G89.29 CHRONIC PAIN OF RIGHT KNEE: Primary | ICD-10-CM

## 2019-06-07 PROCEDURE — 99499 UNLISTED E&M SERVICE: CPT | Mod: S$GLB,,, | Performed by: ORTHOPAEDIC SURGERY

## 2019-06-07 PROCEDURE — 99499 NO LOS: ICD-10-PCS | Mod: S$GLB,,, | Performed by: ORTHOPAEDIC SURGERY

## 2019-06-07 PROCEDURE — 99999 PR PBB SHADOW E&M-EST. PATIENT-LVL II: ICD-10-PCS | Mod: PBBFAC,,, | Performed by: ORTHOPAEDIC SURGERY

## 2019-06-07 PROCEDURE — 20610 LARGE JOINT ASPIRATION/INJECTION: R KNEE: ICD-10-PCS | Mod: RT,S$GLB,, | Performed by: ORTHOPAEDIC SURGERY

## 2019-06-07 PROCEDURE — 99999 PR PBB SHADOW E&M-EST. PATIENT-LVL II: CPT | Mod: PBBFAC,,, | Performed by: ORTHOPAEDIC SURGERY

## 2019-06-07 PROCEDURE — 20610 DRAIN/INJ JOINT/BURSA W/O US: CPT | Mod: RT,S$GLB,, | Performed by: ORTHOPAEDIC SURGERY

## 2019-06-07 NOTE — TELEPHONE ENCOUNTER
Informed patient Euflexxa series have been approved by insurance.    ----- Message from Tita De sent at 6/7/2019  7:58 AM CDT -----  Contact: Self  Patient called to see if you got the insurance to approve her injection and according to her appt it looks like it was approved but then it shows that it needs to be rescheduled?  Please call the patient back to tell her if she is to come in today at [phone].  Thanks

## 2019-06-07 NOTE — PROCEDURES
Large Joint Aspiration/Injection: R knee  Date/Time: 6/7/2019 9:31 AM  Performed by: Dannie Jones MD  Authorized by: Dannie Jones MD     Consent Done?:  Yes (Verbal)  Indications:  Pain  Timeout: Prior to procedure the correct patient, procedure, and site was verified      Location:  Knee  Site:  R knee  Prep: Patient was prepped and draped in usual sterile fashion    Ultrasonic Guidance for needle placement: No  Needle size:  21 G  Approach:  Anterolateral  Medications:  20 mg sodium hyaluronate (EUFLEXXA) 10 mg/mL(mw 2.4 -3.6 million)  Patient tolerance:  Patient tolerated the procedure well with no immediate complications

## 2019-06-14 ENCOUNTER — OFFICE VISIT (OUTPATIENT)
Dept: ORTHOPEDICS | Facility: CLINIC | Age: 64
End: 2019-06-14
Payer: COMMERCIAL

## 2019-06-14 VITALS — HEIGHT: 62 IN | WEIGHT: 244 LBS | BODY MASS INDEX: 44.9 KG/M2

## 2019-06-14 DIAGNOSIS — M25.561 CHRONIC PAIN OF RIGHT KNEE: Primary | ICD-10-CM

## 2019-06-14 DIAGNOSIS — M17.11 PRIMARY OSTEOARTHRITIS OF RIGHT KNEE: ICD-10-CM

## 2019-06-14 DIAGNOSIS — G89.29 CHRONIC PAIN OF RIGHT KNEE: Primary | ICD-10-CM

## 2019-06-14 PROCEDURE — 99499 NO LOS: ICD-10-PCS | Mod: S$GLB,,, | Performed by: ORTHOPAEDIC SURGERY

## 2019-06-14 PROCEDURE — 20610 DRAIN/INJ JOINT/BURSA W/O US: CPT | Mod: S$GLB,,, | Performed by: ORTHOPAEDIC SURGERY

## 2019-06-14 PROCEDURE — 99999 PR PBB SHADOW E&M-EST. PATIENT-LVL II: CPT | Mod: PBBFAC,,, | Performed by: ORTHOPAEDIC SURGERY

## 2019-06-14 PROCEDURE — 20610 LARGE JOINT ASPIRATION/INJECTION: R KNEE: ICD-10-PCS | Mod: S$GLB,,, | Performed by: ORTHOPAEDIC SURGERY

## 2019-06-14 PROCEDURE — 99999 PR PBB SHADOW E&M-EST. PATIENT-LVL II: ICD-10-PCS | Mod: PBBFAC,,, | Performed by: ORTHOPAEDIC SURGERY

## 2019-06-14 PROCEDURE — 99499 UNLISTED E&M SERVICE: CPT | Mod: S$GLB,,, | Performed by: ORTHOPAEDIC SURGERY

## 2019-06-14 NOTE — PROCEDURES
Large Joint Aspiration/Injection: R knee  Date/Time: 6/14/2019 10:18 AM  Performed by: Dannie Jones MD  Authorized by: Dannie Jones MD     Consent Done?:  Yes (Verbal)  Indications:  Pain  Timeout: Prior to procedure the correct patient, procedure, and site was verified      Location:  Knee  Site:  R knee  Prep: Patient was prepped and draped in usual sterile fashion    Ultrasonic Guidance for needle placement: No  Needle size:  21 G  Approach:  Anterolateral  Medications:  20 mg sodium hyaluronate (EUFLEXXA) 10 mg/mL(mw 2.4 -3.6 million)  Patient tolerance:  Patient tolerated the procedure well with no immediate complications

## 2019-06-21 ENCOUNTER — OFFICE VISIT (OUTPATIENT)
Dept: ORTHOPEDICS | Facility: CLINIC | Age: 64
End: 2019-06-21
Payer: COMMERCIAL

## 2019-06-21 VITALS — BODY MASS INDEX: 44.9 KG/M2 | HEIGHT: 62 IN | WEIGHT: 244 LBS

## 2019-06-21 DIAGNOSIS — M17.11 PRIMARY OSTEOARTHRITIS OF RIGHT KNEE: Primary | ICD-10-CM

## 2019-06-21 PROCEDURE — 20610 LARGE JOINT ASPIRATION/INJECTION: R KNEE: ICD-10-PCS | Mod: S$GLB,,, | Performed by: ORTHOPAEDIC SURGERY

## 2019-06-21 PROCEDURE — 99999 PR PBB SHADOW E&M-EST. PATIENT-LVL III: CPT | Mod: PBBFAC,,, | Performed by: ORTHOPAEDIC SURGERY

## 2019-06-21 PROCEDURE — 99499 UNLISTED E&M SERVICE: CPT | Mod: S$GLB,,, | Performed by: ORTHOPAEDIC SURGERY

## 2019-06-21 PROCEDURE — 99499 NO LOS: ICD-10-PCS | Mod: S$GLB,,, | Performed by: ORTHOPAEDIC SURGERY

## 2019-06-21 PROCEDURE — 20610 DRAIN/INJ JOINT/BURSA W/O US: CPT | Mod: S$GLB,,, | Performed by: ORTHOPAEDIC SURGERY

## 2019-06-21 PROCEDURE — 99999 PR PBB SHADOW E&M-EST. PATIENT-LVL III: ICD-10-PCS | Mod: PBBFAC,,, | Performed by: ORTHOPAEDIC SURGERY

## 2019-06-21 NOTE — PROCEDURES
Large Joint Aspiration/Injection: R knee  Date/Time: 6/21/2019 10:59 AM  Performed by: Dannie Jones MD  Authorized by: Dannie Jones MD     Consent Done?:  Yes (Verbal)  Indications:  Pain  Timeout: Prior to procedure the correct patient, procedure, and site was verified      Location:  Knee  Site:  R knee  Prep: Patient was prepped and draped in usual sterile fashion    Ultrasonic Guidance for needle placement: No  Needle size:  21 G  Approach:  Anterolateral  Medications:  20 mg sodium hyaluronate (EUFLEXXA) 10 mg/mL(mw 2.4 -3.6 million)  Patient tolerance:  Patient tolerated the procedure well with no immediate complications

## 2019-06-21 NOTE — PROCEDURES
Large Joint Aspiration/Injection: R knee, L knee  Date/Time: 6/21/2019 10:57 AM  Performed by: Dannie Jones MD  Authorized by: Dannie Jones MD     Consent Done?:  Yes (Verbal)  Indications:  Pain  Procedure site marked: Yes      Location:  Knee  Site:  R knee and L knee  Ultrasonic Guidance for needle placement: No  Needle size:  22 G  Approach:  Anterolateral  Medications:  20 mg sodium hyaluronate (EUFLEXXA) 10 mg/mL(mw 2.4 -3.6 million); 20 mg sodium hyaluronate (EUFLEXXA) 10 mg/mL(mw 2.4 -3.6 million)  Patient tolerance:  Patient tolerated the procedure well with no immediate complications

## 2020-06-30 RX ORDER — CHOLESTYRAMINE 4 G/5.5G
POWDER, FOR SUSPENSION ORAL
Qty: 90 PACKET | Refills: 0 | Status: SHIPPED | OUTPATIENT
Start: 2020-06-30 | End: 2020-12-08

## 2020-06-30 RX ORDER — VENLAFAXINE HYDROCHLORIDE 75 MG/1
CAPSULE, EXTENDED RELEASE ORAL
Qty: 90 CAPSULE | Refills: 0 | Status: SHIPPED | OUTPATIENT
Start: 2020-06-30 | End: 2020-10-07

## 2020-07-14 ENCOUNTER — PATIENT OUTREACH (OUTPATIENT)
Dept: ADMINISTRATIVE | Facility: HOSPITAL | Age: 65
End: 2020-07-14

## 2020-07-15 ENCOUNTER — PATIENT OUTREACH (OUTPATIENT)
Dept: ADMINISTRATIVE | Facility: HOSPITAL | Age: 65
End: 2020-07-15

## 2020-07-15 DIAGNOSIS — Z12.31 ENCOUNTER FOR SCREENING MAMMOGRAM FOR MALIGNANT NEOPLASM OF BREAST: Primary | ICD-10-CM

## 2020-07-15 NOTE — PROGRESS NOTES
Health Maintenance Updated.   Immunizations: Abstracted.   Care Everywhere: Abstracted: No new results found.     Health Maintenance Due   Topic    HIV Screening     DEXA SCAN     Shingles Vaccine (1 of 2)    Mammogram     Pneumococcal Vaccine (65+ Low/Medium Risk) (1 of 2 - PCV13)      Mammo:Scheduled

## 2020-07-29 ENCOUNTER — TELEPHONE (OUTPATIENT)
Dept: FAMILY MEDICINE | Facility: CLINIC | Age: 65
End: 2020-07-29

## 2020-07-29 ENCOUNTER — HOSPITAL ENCOUNTER (OUTPATIENT)
Dept: RADIOLOGY | Facility: HOSPITAL | Age: 65
Discharge: HOME | End: 2020-07-29
Attending: FAMILY MEDICINE
Payer: COMMERCIAL

## 2020-07-29 ENCOUNTER — OFFICE VISIT (OUTPATIENT)
Dept: FAMILY MEDICINE | Facility: CLINIC | Age: 65
End: 2020-07-29
Payer: COMMERCIAL

## 2020-07-29 VITALS
TEMPERATURE: 97 F | DIASTOLIC BLOOD PRESSURE: 68 MMHG | BODY MASS INDEX: 44.9 KG/M2 | HEART RATE: 83 BPM | HEIGHT: 62 IN | HEIGHT: 63 IN | SYSTOLIC BLOOD PRESSURE: 126 MMHG | WEIGHT: 256.81 LBS | BODY MASS INDEX: 45.5 KG/M2 | WEIGHT: 244 LBS

## 2020-07-29 DIAGNOSIS — M79.7 FIBROMYALGIA: ICD-10-CM

## 2020-07-29 DIAGNOSIS — M94.9 DISORDER OF BONE AND ARTICULAR CARTILAGE: ICD-10-CM

## 2020-07-29 DIAGNOSIS — E66.01 OBESITY, MORBID, BMI 40.0-49.9: ICD-10-CM

## 2020-07-29 DIAGNOSIS — M89.9 DISORDER OF BONE AND ARTICULAR CARTILAGE: ICD-10-CM

## 2020-07-29 DIAGNOSIS — Z00.00 ROUTINE HISTORY AND PHYSICAL EXAMINATION OF ADULT: Primary | ICD-10-CM

## 2020-07-29 DIAGNOSIS — Z23 NEED FOR PNEUMOCOCCAL VACCINATION: ICD-10-CM

## 2020-07-29 DIAGNOSIS — K58.0 IRRITABLE BOWEL SYNDROME WITH DIARRHEA: ICD-10-CM

## 2020-07-29 DIAGNOSIS — M17.11 PRIMARY OSTEOARTHRITIS OF RIGHT KNEE: ICD-10-CM

## 2020-07-29 DIAGNOSIS — Z12.31 ENCOUNTER FOR SCREENING MAMMOGRAM FOR MALIGNANT NEOPLASM OF BREAST: ICD-10-CM

## 2020-07-29 DIAGNOSIS — F33.40 RECURRENT MAJOR DEPRESSIVE DISORDER, IN REMISSION: ICD-10-CM

## 2020-07-29 PROCEDURE — 90670 PCV13 VACCINE IM: CPT | Mod: S$GLB,,, | Performed by: FAMILY MEDICINE

## 2020-07-29 PROCEDURE — 77080 DXA BONE DENSITY AXIAL: CPT | Mod: TC,PO

## 2020-07-29 PROCEDURE — 99999 PR PBB SHADOW E&M-EST. PATIENT-LVL III: CPT | Mod: PBBFAC,,, | Performed by: FAMILY MEDICINE

## 2020-07-29 PROCEDURE — 77063 BREAST TOMOSYNTHESIS BI: CPT | Mod: 26,,, | Performed by: RADIOLOGY

## 2020-07-29 PROCEDURE — 90471 IMMUNIZATION ADMIN: CPT | Mod: S$GLB,,, | Performed by: FAMILY MEDICINE

## 2020-07-29 PROCEDURE — 99397 PER PM REEVAL EST PAT 65+ YR: CPT | Mod: 25,S$GLB,, | Performed by: FAMILY MEDICINE

## 2020-07-29 PROCEDURE — 90670 PNEUMOCOCCAL CONJUGATE VACCINE 13-VALENT LESS THAN 5YO & GREATER THAN: ICD-10-PCS | Mod: S$GLB,,, | Performed by: FAMILY MEDICINE

## 2020-07-29 PROCEDURE — 77080 DEXA BONE DENSITY SPINE HIP: ICD-10-PCS | Mod: 26,,, | Performed by: RADIOLOGY

## 2020-07-29 PROCEDURE — 77063 MAMMO DIGITAL SCREENING BILAT WITH TOMOSYNTHESIS_CAD: ICD-10-PCS | Mod: 26,,, | Performed by: RADIOLOGY

## 2020-07-29 PROCEDURE — 77080 DXA BONE DENSITY AXIAL: CPT | Mod: 26,,, | Performed by: RADIOLOGY

## 2020-07-29 PROCEDURE — 77067 MAMMO DIGITAL SCREENING BILAT WITH TOMOSYNTHESIS_CAD: ICD-10-PCS | Mod: 26,,, | Performed by: RADIOLOGY

## 2020-07-29 PROCEDURE — 77067 SCR MAMMO BI INCL CAD: CPT | Mod: TC,PO

## 2020-07-29 PROCEDURE — 77067 SCR MAMMO BI INCL CAD: CPT | Mod: 26,,, | Performed by: RADIOLOGY

## 2020-07-29 PROCEDURE — 90471 PNEUMOCOCCAL CONJUGATE VACCINE 13-VALENT LESS THAN 5YO & GREATER THAN: ICD-10-PCS | Mod: S$GLB,,, | Performed by: FAMILY MEDICINE

## 2020-07-29 PROCEDURE — 99999 PR PBB SHADOW E&M-EST. PATIENT-LVL III: ICD-10-PCS | Mod: PBBFAC,,, | Performed by: FAMILY MEDICINE

## 2020-07-29 PROCEDURE — 99397 PR PREVENTIVE VISIT,EST,65 & OVER: ICD-10-PCS | Mod: 25,S$GLB,, | Performed by: FAMILY MEDICINE

## 2020-07-29 RX ORDER — DICYCLOMINE HYDROCHLORIDE 10 MG/1
CAPSULE ORAL
Qty: 270 CAPSULE | Refills: 3 | Status: SHIPPED | OUTPATIENT
Start: 2020-07-29 | End: 2021-09-27

## 2020-07-29 NOTE — PROGRESS NOTES
Patient presents physical exam.  Follow-up depression-doing well.  No SI/HI.  Doing well with regards to fibromyalgia and irritable bowel syndrome.  She is trying to work on her weight.    Knee arthritis doing fairly well at present.  Previous viscosupplementation.      Past Medical History:  Past Medical History:   Diagnosis Date    Depression     Diverticulosis 9/23/2008    DJD (degenerative joint disease) of knee     Fibromyalgia     Gambling problem     IBS (irritable bowel syndrome)     Obesity, morbid, BMI 40.0-49.9     S/P colonoscopy 9/23/2008    Sensorineural hearing loss     left ear-Sudden     Past Surgical History:   Procedure Laterality Date    APPENDECTOMY      CHOLECYSTECTOMY      COLONOSCOPY  09/08    repeat 5 yrs    FOOT NEUROMA SURGERY      HEEL SPUR SURGERY      HYSTERECTOMY      Fibroid, endometriosis. Ovaries intact    ROTATOR CUFF REPAIR Right     toe surgeries      Tonsillectomy       Social History     Socioeconomic History    Marital status: Single     Spouse name: Not on file    Number of children: Not on file    Years of education: Not on file    Highest education level: Not on file   Occupational History    Not on file   Social Needs    Financial resource strain: Not on file    Food insecurity     Worry: Not on file     Inability: Not on file    Transportation needs     Medical: Not on file     Non-medical: Not on file   Tobacco Use    Smoking status: Never Smoker   Substance and Sexual Activity    Alcohol use: Yes     Comment: occasion    Drug use: No    Sexual activity: Not on file   Lifestyle    Physical activity     Days per week: Not on file     Minutes per session: Not on file    Stress: Not on file   Relationships    Social connections     Talks on phone: Not on file     Gets together: Not on file     Attends Rastafari service: Not on file     Active member of club or organization: Not on file     Attends meetings of clubs or organizations: Not on  "file     Relationship status: Not on file   Other Topics Concern    Not on file   Social History Narrative    Not on file     Family History   Problem Relation Age of Onset    Diabetes Mother     Diabetes Father     Depression Father     Breast cancer Maternal Aunt      Review of patient's allergies indicates:   Allergen Reactions    Venomil wasp venom [venom-wasp]      Swelling and difficulty breathing     Current Outpatient Medications on File Prior to Visit   Medication Sig Dispense Refill    Lactobac no.41/Bifidobact no.7 (PROBIOTIC-10 ORAL) Take 1 capsule by mouth once daily.      PREVALITE 4 gram PwPk TAKE 1 PACKET (4 GRAMS TOTAL) BY MOUTH ONCE DAILY. MIX IN WATER 90 packet 0    venlafaxine (EFFEXOR-XR) 75 MG 24 hr capsule Take 1 capsule by mouth once daily 90 capsule 0    [DISCONTINUED] dicyclomine (BENTYL) 10 MG capsule TAKE ONE CAPSULE BY MOUTH ONCE DAILY BEFORE MEAL(S) 270 capsule 3    naproxen (NAPROSYN ORAL) Take 440 mg by mouth 2 (two) times daily as needed.       No current facility-administered medications on file prior to visit.            ROS:  GENERAL: No fever, chills,  or significant weight changes.  HEENT: No headache, vision or hearing complaints.  No dysphagia  CHEST: Denies HU, cyanosis, wheezing, cough and sputum production.  CARDIOVASCULAR: Denies chest pain, PND, orthopnea or reduced exercise tolerance.  ABDOMEN: Appetite fine. Denies  hematemesis or blood in stool.  URINARY: No flank pain, dysuria or hematuria.  MUSCULOSKELETAL:  As above  NEUROLOGIC: No focal weakness numbness or paresthesia  PSYCHIATRIC: Denies SI/HI      OBJECTIVE:     Vitals:    07/29/20 0807   BP: 126/68   Pulse: 83   Temp: 97.2 °F (36.2 °C)   TempSrc: Oral   Weight: 116.5 kg (256 lb 13.4 oz)   Height: 5' 3" (1.6 m)     Wt Readings from Last 3 Encounters:   07/29/20 116.5 kg (256 lb 13.4 oz)   06/21/19 110.7 kg (244 lb)   06/14/19 110.7 kg (244 lb)     APPEARANCE: Well nourished, well developed, in no " acute distress.    HEAD: Normocephalic.  Atraumatic.  No sinus tenderness.  EYES:   Right eye: Pupil reactive.  Conjunctiva clear.    Left eye: Pupil reactive.  Conjunctiva clear.    Both fundi:  Grossly normal to nondilated exam. EOMI.    EARS: TM's intact. Light reflex normal. No retraction or perforation.    NOSE:  clear.  MOUTH & THROAT:  No pharyngeal erythema or exudate. No lesions.  NECK: Supple. No bruits.  No JVD.  No cervical lymphadenopathy.  No thyromegaly.    CHEST: Breath sounds clear bilaterally.  Normal respiratory effort  CARDIOVASCULAR: Normal rate.  Regular rhythm.  No murmurs.  No rub.  No gallops.  ABDOMEN: Bowel sounds normal.  Soft.  No tenderness.  No organomegaly.  PERIPHERAL VASCULAR: No cyanosis.  No clubbing.  No edema.  NEUROLOGIC: No focal findings.  MENTAL STATUS: Alert.  Oriented x 3.          Charlene was seen today for annual exam.    Diagnoses and all orders for this visit:    Routine history and physical examination of adult  -     Lipid Panel; Future  -     Comprehensive metabolic panel; Future  -     CBC auto differential; Future    Obesity, morbid, BMI 40.0-49.9    Recurrent major depressive disorder, in remission    Irritable bowel syndrome with diarrhea    Primary osteoarthritis of right knee    Fibromyalgia  -     Lipid Panel; Future  -     Comprehensive metabolic panel; Future  -     CBC auto differential; Future    Disorder of bone and articular cartilage  -     DXA Bone Density Spine And Hip; Future    Need for pneumococcal vaccination  -     Pneumococcal Conjugate Vaccine (13 Valent) (IM)    Other orders  -     dicyclomine (BENTYL) 10 MG capsule; TAKE ONE CAPSULE BY MOUTH ONCE DAILY BEFORE MEAL(S)     Continue current medication   Anticipatory guidance: Don't smoke.  Healthy diet and regular exercise recommended.

## 2020-07-29 NOTE — TELEPHONE ENCOUNTER
----- Message from Melyssa Cortez sent at 7/29/2020  9:10 AM CDT -----  Type:  Pharmacy Calling to Clarify an RX    Name of Caller:  Pharmacy Name:[facility]  Prescription Name:dicyclomine (BENTYL) 10 MG capsule  What do they need to clarify?:Clarification  Best Call Back Number:[phone]  Additional Information:

## 2020-10-07 RX ORDER — VENLAFAXINE HYDROCHLORIDE 75 MG/1
CAPSULE, EXTENDED RELEASE ORAL
Qty: 90 CAPSULE | Refills: 3 | Status: SHIPPED | OUTPATIENT
Start: 2020-10-07 | End: 2021-09-10

## 2020-12-08 RX ORDER — CHOLESTYRAMINE 4 G/5.5G
POWDER, FOR SUSPENSION ORAL
Qty: 90 PACKET | Refills: 3 | Status: SHIPPED | OUTPATIENT
Start: 2020-12-08 | End: 2022-02-02

## 2021-01-27 DIAGNOSIS — M25.511 ACUTE PAIN OF RIGHT SHOULDER DUE TO TRAUMA: Primary | ICD-10-CM

## 2021-01-27 DIAGNOSIS — G89.11 ACUTE PAIN OF RIGHT SHOULDER DUE TO TRAUMA: Primary | ICD-10-CM

## 2021-01-29 ENCOUNTER — OFFICE VISIT (OUTPATIENT)
Dept: ORTHOPEDICS | Facility: CLINIC | Age: 66
End: 2021-01-29
Payer: COMMERCIAL

## 2021-01-29 ENCOUNTER — HOSPITAL ENCOUNTER (OUTPATIENT)
Dept: RADIOLOGY | Facility: HOSPITAL | Age: 66
Discharge: HOME | End: 2021-01-29
Attending: ORTHOPAEDIC SURGERY
Payer: COMMERCIAL

## 2021-01-29 VITALS — BODY MASS INDEX: 44.91 KG/M2 | WEIGHT: 244.06 LBS | HEIGHT: 62 IN

## 2021-01-29 DIAGNOSIS — F33.40 RECURRENT MAJOR DEPRESSIVE DISORDER, IN REMISSION: ICD-10-CM

## 2021-01-29 DIAGNOSIS — M25.511 ACUTE PAIN OF RIGHT SHOULDER DUE TO TRAUMA: Primary | ICD-10-CM

## 2021-01-29 DIAGNOSIS — K58.0 IRRITABLE BOWEL SYNDROME WITH DIARRHEA: ICD-10-CM

## 2021-01-29 DIAGNOSIS — M25.511 ACUTE PAIN OF RIGHT SHOULDER DUE TO TRAUMA: ICD-10-CM

## 2021-01-29 DIAGNOSIS — E66.01 OBESITY, MORBID, BMI 40.0-49.9: ICD-10-CM

## 2021-01-29 DIAGNOSIS — G89.11 ACUTE PAIN OF RIGHT SHOULDER DUE TO TRAUMA: ICD-10-CM

## 2021-01-29 DIAGNOSIS — G89.11 ACUTE PAIN OF RIGHT SHOULDER DUE TO TRAUMA: Primary | ICD-10-CM

## 2021-01-29 PROCEDURE — 3008F PR BODY MASS INDEX (BMI) DOCUMENTED: ICD-10-PCS | Mod: CPTII,S$GLB,, | Performed by: ORTHOPAEDIC SURGERY

## 2021-01-29 PROCEDURE — 1101F PT FALLS ASSESS-DOCD LE1/YR: CPT | Mod: CPTII,S$GLB,, | Performed by: ORTHOPAEDIC SURGERY

## 2021-01-29 PROCEDURE — 1159F MED LIST DOCD IN RCRD: CPT | Mod: S$GLB,,, | Performed by: ORTHOPAEDIC SURGERY

## 2021-01-29 PROCEDURE — 3288F FALL RISK ASSESSMENT DOCD: CPT | Mod: CPTII,S$GLB,, | Performed by: ORTHOPAEDIC SURGERY

## 2021-01-29 PROCEDURE — 73030 XR SHOULDER TRAUMA 3 VIEW RIGHT: ICD-10-PCS | Mod: 26,RT,, | Performed by: RADIOLOGY

## 2021-01-29 PROCEDURE — 99999 PR PBB SHADOW E&M-EST. PATIENT-LVL III: ICD-10-PCS | Mod: PBBFAC,,, | Performed by: ORTHOPAEDIC SURGERY

## 2021-01-29 PROCEDURE — 99999 PR PBB SHADOW E&M-EST. PATIENT-LVL III: CPT | Mod: PBBFAC,,, | Performed by: ORTHOPAEDIC SURGERY

## 2021-01-29 PROCEDURE — 20610 DRAIN/INJ JOINT/BURSA W/O US: CPT | Mod: RT,S$GLB,, | Performed by: ORTHOPAEDIC SURGERY

## 2021-01-29 PROCEDURE — 1159F PR MEDICATION LIST DOCUMENTED IN MEDICAL RECORD: ICD-10-PCS | Mod: S$GLB,,, | Performed by: ORTHOPAEDIC SURGERY

## 2021-01-29 PROCEDURE — 20610 LARGE JOINT ASPIRATION/INJECTION: R SUBACROMIAL BURSA: ICD-10-PCS | Mod: RT,S$GLB,, | Performed by: ORTHOPAEDIC SURGERY

## 2021-01-29 PROCEDURE — 3288F PR FALLS RISK ASSESSMENT DOCUMENTED: ICD-10-PCS | Mod: CPTII,S$GLB,, | Performed by: ORTHOPAEDIC SURGERY

## 2021-01-29 PROCEDURE — 99214 PR OFFICE/OUTPT VISIT, EST, LEVL IV, 30-39 MIN: ICD-10-PCS | Mod: 25,S$GLB,, | Performed by: ORTHOPAEDIC SURGERY

## 2021-01-29 PROCEDURE — 1125F AMNT PAIN NOTED PAIN PRSNT: CPT | Mod: S$GLB,,, | Performed by: ORTHOPAEDIC SURGERY

## 2021-01-29 PROCEDURE — 73030 X-RAY EXAM OF SHOULDER: CPT | Mod: TC,PO,RT

## 2021-01-29 PROCEDURE — 1125F PR PAIN SEVERITY QUANTIFIED, PAIN PRESENT: ICD-10-PCS | Mod: S$GLB,,, | Performed by: ORTHOPAEDIC SURGERY

## 2021-01-29 PROCEDURE — 1101F PR PT FALLS ASSESS DOC 0-1 FALLS W/OUT INJ PAST YR: ICD-10-PCS | Mod: CPTII,S$GLB,, | Performed by: ORTHOPAEDIC SURGERY

## 2021-01-29 PROCEDURE — 73030 X-RAY EXAM OF SHOULDER: CPT | Mod: 26,RT,, | Performed by: RADIOLOGY

## 2021-01-29 PROCEDURE — 99214 OFFICE O/P EST MOD 30 MIN: CPT | Mod: 25,S$GLB,, | Performed by: ORTHOPAEDIC SURGERY

## 2021-01-29 PROCEDURE — 3008F BODY MASS INDEX DOCD: CPT | Mod: CPTII,S$GLB,, | Performed by: ORTHOPAEDIC SURGERY

## 2021-01-29 RX ORDER — TRIAMCINOLONE ACETONIDE 40 MG/ML
80 INJECTION, SUSPENSION INTRA-ARTICULAR; INTRAMUSCULAR
Status: DISCONTINUED | OUTPATIENT
Start: 2021-01-29 | End: 2021-01-29 | Stop reason: HOSPADM

## 2021-01-29 RX ADMIN — TRIAMCINOLONE ACETONIDE 80 MG: 40 INJECTION, SUSPENSION INTRA-ARTICULAR; INTRAMUSCULAR at 11:01

## 2021-02-23 ENCOUNTER — PATIENT OUTREACH (OUTPATIENT)
Dept: ADMINISTRATIVE | Facility: HOSPITAL | Age: 66
End: 2021-02-23

## 2021-03-22 ENCOUNTER — PATIENT OUTREACH (OUTPATIENT)
Dept: ADMINISTRATIVE | Facility: HOSPITAL | Age: 66
End: 2021-03-22

## 2021-07-01 ENCOUNTER — PATIENT MESSAGE (OUTPATIENT)
Dept: ADMINISTRATIVE | Facility: OTHER | Age: 66
End: 2021-07-01

## 2021-07-12 ENCOUNTER — TELEPHONE (OUTPATIENT)
Dept: FAMILY MEDICINE | Facility: CLINIC | Age: 66
End: 2021-07-12

## 2021-07-14 ENCOUNTER — OFFICE VISIT (OUTPATIENT)
Dept: FAMILY MEDICINE | Facility: CLINIC | Age: 66
End: 2021-07-14
Payer: COMMERCIAL

## 2021-07-14 VITALS
HEIGHT: 62 IN | BODY MASS INDEX: 47.02 KG/M2 | TEMPERATURE: 98 F | WEIGHT: 255.5 LBS | OXYGEN SATURATION: 97 % | HEART RATE: 77 BPM | DIASTOLIC BLOOD PRESSURE: 86 MMHG | SYSTOLIC BLOOD PRESSURE: 144 MMHG

## 2021-07-14 DIAGNOSIS — E66.01 OBESITY, MORBID, BMI 40.0-49.9: ICD-10-CM

## 2021-07-14 DIAGNOSIS — F43.0 ACUTE STRESS REACTION: ICD-10-CM

## 2021-07-14 DIAGNOSIS — I82.4Z1 ACUTE DEEP VEIN THROMBOSIS (DVT) OF DISTAL VEIN OF RIGHT LOWER EXTREMITY: Primary | ICD-10-CM

## 2021-07-14 DIAGNOSIS — F33.40 RECURRENT MAJOR DEPRESSIVE DISORDER, IN REMISSION: ICD-10-CM

## 2021-07-14 PROCEDURE — 99999 PR PBB SHADOW E&M-EST. PATIENT-LVL IV: ICD-10-PCS | Mod: PBBFAC,,, | Performed by: FAMILY MEDICINE

## 2021-07-14 PROCEDURE — 1126F AMNT PAIN NOTED NONE PRSNT: CPT | Mod: S$GLB,,, | Performed by: FAMILY MEDICINE

## 2021-07-14 PROCEDURE — 99214 OFFICE O/P EST MOD 30 MIN: CPT | Mod: S$GLB,,, | Performed by: FAMILY MEDICINE

## 2021-07-14 PROCEDURE — 99214 PR OFFICE/OUTPT VISIT, EST, LEVL IV, 30-39 MIN: ICD-10-PCS | Mod: S$GLB,,, | Performed by: FAMILY MEDICINE

## 2021-07-14 PROCEDURE — 1126F PR PAIN SEVERITY QUANTIFIED, NO PAIN PRESENT: ICD-10-PCS | Mod: S$GLB,,, | Performed by: FAMILY MEDICINE

## 2021-07-14 PROCEDURE — 1101F PR PT FALLS ASSESS DOC 0-1 FALLS W/OUT INJ PAST YR: ICD-10-PCS | Mod: CPTII,S$GLB,, | Performed by: FAMILY MEDICINE

## 2021-07-14 PROCEDURE — 1159F MED LIST DOCD IN RCRD: CPT | Mod: S$GLB,,, | Performed by: FAMILY MEDICINE

## 2021-07-14 PROCEDURE — 3288F FALL RISK ASSESSMENT DOCD: CPT | Mod: CPTII,S$GLB,, | Performed by: FAMILY MEDICINE

## 2021-07-14 PROCEDURE — 99999 PR PBB SHADOW E&M-EST. PATIENT-LVL IV: CPT | Mod: PBBFAC,,, | Performed by: FAMILY MEDICINE

## 2021-07-14 PROCEDURE — 3008F PR BODY MASS INDEX (BMI) DOCUMENTED: ICD-10-PCS | Mod: CPTII,S$GLB,, | Performed by: FAMILY MEDICINE

## 2021-07-14 PROCEDURE — 3288F PR FALLS RISK ASSESSMENT DOCUMENTED: ICD-10-PCS | Mod: CPTII,S$GLB,, | Performed by: FAMILY MEDICINE

## 2021-07-14 PROCEDURE — 3008F BODY MASS INDEX DOCD: CPT | Mod: CPTII,S$GLB,, | Performed by: FAMILY MEDICINE

## 2021-07-14 PROCEDURE — 1159F PR MEDICATION LIST DOCUMENTED IN MEDICAL RECORD: ICD-10-PCS | Mod: S$GLB,,, | Performed by: FAMILY MEDICINE

## 2021-07-14 PROCEDURE — 1101F PT FALLS ASSESS-DOCD LE1/YR: CPT | Mod: CPTII,S$GLB,, | Performed by: FAMILY MEDICINE

## 2021-07-21 ENCOUNTER — OFFICE VISIT (OUTPATIENT)
Dept: HEMATOLOGY/ONCOLOGY | Facility: CLINIC | Age: 66
End: 2021-07-21
Payer: COMMERCIAL

## 2021-07-21 ENCOUNTER — PATIENT MESSAGE (OUTPATIENT)
Dept: HEMATOLOGY/ONCOLOGY | Facility: CLINIC | Age: 66
End: 2021-07-21

## 2021-07-21 VITALS
HEART RATE: 79 BPM | HEIGHT: 63 IN | OXYGEN SATURATION: 97 % | WEIGHT: 252.75 LBS | BODY MASS INDEX: 44.79 KG/M2 | TEMPERATURE: 97 F | SYSTOLIC BLOOD PRESSURE: 158 MMHG | DIASTOLIC BLOOD PRESSURE: 80 MMHG

## 2021-07-21 DIAGNOSIS — I82.4Z1 ACUTE DEEP VEIN THROMBOSIS (DVT) OF DISTAL VEIN OF RIGHT LOWER EXTREMITY: ICD-10-CM

## 2021-07-21 PROCEDURE — 1101F PT FALLS ASSESS-DOCD LE1/YR: CPT | Mod: CPTII,S$GLB,, | Performed by: INTERNAL MEDICINE

## 2021-07-21 PROCEDURE — 99999 PR PBB SHADOW E&M-EST. PATIENT-LVL III: CPT | Mod: PBBFAC,,, | Performed by: INTERNAL MEDICINE

## 2021-07-21 PROCEDURE — 99999 PR PBB SHADOW E&M-EST. PATIENT-LVL III: ICD-10-PCS | Mod: PBBFAC,,, | Performed by: INTERNAL MEDICINE

## 2021-07-21 PROCEDURE — 1159F MED LIST DOCD IN RCRD: CPT | Mod: CPTII,S$GLB,, | Performed by: INTERNAL MEDICINE

## 2021-07-21 PROCEDURE — 3008F PR BODY MASS INDEX (BMI) DOCUMENTED: ICD-10-PCS | Mod: CPTII,S$GLB,, | Performed by: INTERNAL MEDICINE

## 2021-07-21 PROCEDURE — 1126F AMNT PAIN NOTED NONE PRSNT: CPT | Mod: CPTII,S$GLB,, | Performed by: INTERNAL MEDICINE

## 2021-07-21 PROCEDURE — 3008F BODY MASS INDEX DOCD: CPT | Mod: CPTII,S$GLB,, | Performed by: INTERNAL MEDICINE

## 2021-07-21 PROCEDURE — 1101F PR PT FALLS ASSESS DOC 0-1 FALLS W/OUT INJ PAST YR: ICD-10-PCS | Mod: CPTII,S$GLB,, | Performed by: INTERNAL MEDICINE

## 2021-07-21 PROCEDURE — 1126F PR PAIN SEVERITY QUANTIFIED, NO PAIN PRESENT: ICD-10-PCS | Mod: CPTII,S$GLB,, | Performed by: INTERNAL MEDICINE

## 2021-07-21 PROCEDURE — 3288F PR FALLS RISK ASSESSMENT DOCUMENTED: ICD-10-PCS | Mod: CPTII,S$GLB,, | Performed by: INTERNAL MEDICINE

## 2021-07-21 PROCEDURE — 99204 OFFICE O/P NEW MOD 45 MIN: CPT | Mod: S$GLB,,, | Performed by: INTERNAL MEDICINE

## 2021-07-21 PROCEDURE — 3288F FALL RISK ASSESSMENT DOCD: CPT | Mod: CPTII,S$GLB,, | Performed by: INTERNAL MEDICINE

## 2021-07-21 PROCEDURE — 99204 PR OFFICE/OUTPT VISIT, NEW, LEVL IV, 45-59 MIN: ICD-10-PCS | Mod: S$GLB,,, | Performed by: INTERNAL MEDICINE

## 2021-07-21 PROCEDURE — 1159F PR MEDICATION LIST DOCUMENTED IN MEDICAL RECORD: ICD-10-PCS | Mod: CPTII,S$GLB,, | Performed by: INTERNAL MEDICINE

## 2021-09-10 RX ORDER — VENLAFAXINE HYDROCHLORIDE 75 MG/1
CAPSULE, EXTENDED RELEASE ORAL
Qty: 90 CAPSULE | Refills: 3 | Status: SHIPPED | OUTPATIENT
Start: 2021-09-10 | End: 2022-08-30

## 2021-09-27 RX ORDER — DICYCLOMINE HYDROCHLORIDE 10 MG/1
CAPSULE ORAL
Qty: 90 CAPSULE | Refills: 2 | Status: SHIPPED | OUTPATIENT
Start: 2021-09-27 | End: 2022-05-23

## 2021-09-29 DIAGNOSIS — Z12.31 OTHER SCREENING MAMMOGRAM: ICD-10-CM

## 2021-10-12 ENCOUNTER — OFFICE VISIT (OUTPATIENT)
Dept: FAMILY MEDICINE | Facility: CLINIC | Age: 66
End: 2021-10-12
Payer: COMMERCIAL

## 2021-10-12 VITALS
DIASTOLIC BLOOD PRESSURE: 82 MMHG | BODY MASS INDEX: 44.47 KG/M2 | WEIGHT: 251 LBS | HEIGHT: 63 IN | SYSTOLIC BLOOD PRESSURE: 132 MMHG | HEART RATE: 78 BPM | TEMPERATURE: 98 F

## 2021-10-12 DIAGNOSIS — R07.9 CHEST PAIN, UNSPECIFIED TYPE: Primary | ICD-10-CM

## 2021-10-12 PROCEDURE — 90732 PPSV23 VACC 2 YRS+ SUBQ/IM: CPT | Mod: S$GLB,,, | Performed by: FAMILY MEDICINE

## 2021-10-12 PROCEDURE — 90471 PNEUMOCOCCAL POLYSACCHARIDE VACCINE 23-VALENT =>2YO SQ IM: ICD-10-PCS | Mod: S$GLB,,, | Performed by: FAMILY MEDICINE

## 2021-10-12 PROCEDURE — 3008F BODY MASS INDEX DOCD: CPT | Mod: CPTII,S$GLB,, | Performed by: FAMILY MEDICINE

## 2021-10-12 PROCEDURE — 90472 FLU VACCINE - QUADRIVALENT - ADJUVANTED: ICD-10-PCS | Mod: S$GLB,,, | Performed by: FAMILY MEDICINE

## 2021-10-12 PROCEDURE — 1159F PR MEDICATION LIST DOCUMENTED IN MEDICAL RECORD: ICD-10-PCS | Mod: CPTII,S$GLB,, | Performed by: FAMILY MEDICINE

## 2021-10-12 PROCEDURE — 3288F PR FALLS RISK ASSESSMENT DOCUMENTED: ICD-10-PCS | Mod: CPTII,S$GLB,, | Performed by: FAMILY MEDICINE

## 2021-10-12 PROCEDURE — 3008F PR BODY MASS INDEX (BMI) DOCUMENTED: ICD-10-PCS | Mod: CPTII,S$GLB,, | Performed by: FAMILY MEDICINE

## 2021-10-12 PROCEDURE — 3075F PR MOST RECENT SYSTOLIC BLOOD PRESS GE 130-139MM HG: ICD-10-PCS | Mod: CPTII,S$GLB,, | Performed by: FAMILY MEDICINE

## 2021-10-12 PROCEDURE — 99214 OFFICE O/P EST MOD 30 MIN: CPT | Mod: 25,S$GLB,, | Performed by: FAMILY MEDICINE

## 2021-10-12 PROCEDURE — 90471 IMMUNIZATION ADMIN: CPT | Mod: S$GLB,,, | Performed by: FAMILY MEDICINE

## 2021-10-12 PROCEDURE — 90472 IMMUNIZATION ADMIN EACH ADD: CPT | Mod: S$GLB,,, | Performed by: FAMILY MEDICINE

## 2021-10-12 PROCEDURE — 3079F DIAST BP 80-89 MM HG: CPT | Mod: CPTII,S$GLB,, | Performed by: FAMILY MEDICINE

## 2021-10-12 PROCEDURE — 1101F PT FALLS ASSESS-DOCD LE1/YR: CPT | Mod: CPTII,S$GLB,, | Performed by: FAMILY MEDICINE

## 2021-10-12 PROCEDURE — 99999 PR PBB SHADOW E&M-EST. PATIENT-LVL III: ICD-10-PCS | Mod: PBBFAC,,, | Performed by: FAMILY MEDICINE

## 2021-10-12 PROCEDURE — 90694 FLU VACCINE - QUADRIVALENT - ADJUVANTED: ICD-10-PCS | Mod: S$GLB,,, | Performed by: FAMILY MEDICINE

## 2021-10-12 PROCEDURE — 90732 PNEUMOCOCCAL POLYSACCHARIDE VACCINE 23-VALENT =>2YO SQ IM: ICD-10-PCS | Mod: S$GLB,,, | Performed by: FAMILY MEDICINE

## 2021-10-12 PROCEDURE — 90694 VACC AIIV4 NO PRSRV 0.5ML IM: CPT | Mod: S$GLB,,, | Performed by: FAMILY MEDICINE

## 2021-10-12 PROCEDURE — 3288F FALL RISK ASSESSMENT DOCD: CPT | Mod: CPTII,S$GLB,, | Performed by: FAMILY MEDICINE

## 2021-10-12 PROCEDURE — 3079F PR MOST RECENT DIASTOLIC BLOOD PRESSURE 80-89 MM HG: ICD-10-PCS | Mod: CPTII,S$GLB,, | Performed by: FAMILY MEDICINE

## 2021-10-12 PROCEDURE — 99999 PR PBB SHADOW E&M-EST. PATIENT-LVL III: CPT | Mod: PBBFAC,,, | Performed by: FAMILY MEDICINE

## 2021-10-12 PROCEDURE — 3075F SYST BP GE 130 - 139MM HG: CPT | Mod: CPTII,S$GLB,, | Performed by: FAMILY MEDICINE

## 2021-10-12 PROCEDURE — 99214 PR OFFICE/OUTPT VISIT, EST, LEVL IV, 30-39 MIN: ICD-10-PCS | Mod: 25,S$GLB,, | Performed by: FAMILY MEDICINE

## 2021-10-12 PROCEDURE — 1159F MED LIST DOCD IN RCRD: CPT | Mod: CPTII,S$GLB,, | Performed by: FAMILY MEDICINE

## 2021-10-12 PROCEDURE — 1101F PR PT FALLS ASSESS DOC 0-1 FALLS W/OUT INJ PAST YR: ICD-10-PCS | Mod: CPTII,S$GLB,, | Performed by: FAMILY MEDICINE

## 2021-10-15 ENCOUNTER — CLINICAL SUPPORT (OUTPATIENT)
Dept: CARDIOLOGY | Facility: HOSPITAL | Age: 66
End: 2021-10-15
Attending: FAMILY MEDICINE
Payer: COMMERCIAL

## 2021-10-15 VITALS — HEIGHT: 63 IN | WEIGHT: 251 LBS | BODY MASS INDEX: 44.47 KG/M2

## 2021-10-15 DIAGNOSIS — R07.9 CHEST PAIN, UNSPECIFIED TYPE: ICD-10-CM

## 2021-10-15 PROCEDURE — 93351 STRESS ECHO (CUPID ONLY): ICD-10-PCS | Mod: 26,,, | Performed by: INTERNAL MEDICINE

## 2021-10-15 PROCEDURE — 93351 STRESS TTE COMPLETE: CPT | Mod: PO

## 2021-10-15 PROCEDURE — 93351 STRESS TTE COMPLETE: CPT | Mod: 26,,, | Performed by: INTERNAL MEDICINE

## 2021-10-18 ENCOUNTER — HOSPITAL ENCOUNTER (OUTPATIENT)
Dept: RADIOLOGY | Facility: HOSPITAL | Age: 66
Discharge: HOME | End: 2021-10-18
Attending: INTERNAL MEDICINE
Payer: COMMERCIAL

## 2021-10-18 DIAGNOSIS — I82.4Z1 ACUTE DEEP VEIN THROMBOSIS (DVT) OF DISTAL VEIN OF RIGHT LOWER EXTREMITY: ICD-10-CM

## 2021-10-18 LAB
ASCENDING AORTA: 2.92 CM
BSA FOR ECHO PROCEDURE: 2.25 M2
CV ECHO LV RWT: 0.44 CM
CV STRESS BASE HR: 80 BPM
DIASTOLIC BLOOD PRESSURE: 85 MMHG
DOP CALC LVOT AREA: 3.6 CM2
DOP CALC LVOT DIAMETER: 2.14 CM
DOP CALC LVOT PEAK VEL: 0.83 M/S
DOP CALC LVOT STROKE VOLUME: 65.14 CM3
DOP CALCLVOT PEAK VEL VTI: 18.12 CM
E WAVE DECELERATION TIME: 145.7 MSEC
E/A RATIO: 1.12
E/E' RATIO: 9.06 M/S
ECHO LV POSTERIOR WALL: 0.93 CM (ref 0.6–1.1)
EJECTION FRACTION: 60 %
FRACTIONAL SHORTENING: 29 % (ref 28–44)
INTERVENTRICULAR SEPTUM: 0.84 CM (ref 0.6–1.1)
LA MAJOR: 5 CM
LA MINOR: 4.88 CM
LA WIDTH: 4.36 CM
LEFT ATRIUM SIZE: 3.61 CM
LEFT ATRIUM VOLUME INDEX: 31 ML/M2
LEFT ATRIUM VOLUME: 66.08 CM3
LEFT INTERNAL DIMENSION IN SYSTOLE: 2.96 CM (ref 2.1–4)
LEFT VENTRICLE DIASTOLIC VOLUME INDEX: 36.39 ML/M2
LEFT VENTRICLE DIASTOLIC VOLUME: 77.52 ML
LEFT VENTRICLE MASS INDEX: 54 G/M2
LEFT VENTRICLE SYSTOLIC VOLUME INDEX: 15.9 ML/M2
LEFT VENTRICLE SYSTOLIC VOLUME: 33.97 ML
LEFT VENTRICULAR INTERNAL DIMENSION IN DIASTOLE: 4.18 CM (ref 3.5–6)
LEFT VENTRICULAR MASS: 115.09 G
LV LATERAL E/E' RATIO: 8.56 M/S
LV SEPTAL E/E' RATIO: 9.63 M/S
MV A" WAVE DURATION": 10.09 MSEC
MV PEAK A VEL: 0.69 M/S
MV PEAK E VEL: 0.77 M/S
OHS CV CPX 1 MINUTE RECOVERY HEART RATE: 107 BPM
OHS CV CPX 85 PERCENT MAX PREDICTED HEART RATE MALE: 126
OHS CV CPX ESTIMATED METS: 7
OHS CV CPX MAX PREDICTED HEART RATE: 148
OHS CV CPX PATIENT IS FEMALE: 1
OHS CV CPX PATIENT IS MALE: 0
OHS CV CPX PEAK DIASTOLIC BLOOD PRESSURE: 53 MMHG
OHS CV CPX PEAK HEAR RATE: 125 BPM
OHS CV CPX PEAK RATE PRESSURE PRODUCT: NORMAL
OHS CV CPX PEAK SYSTOLIC BLOOD PRESSURE: 215 MMHG
OHS CV CPX PERCENT MAX PREDICTED HEART RATE ACHIEVED: 85
OHS CV CPX RATE PRESSURE PRODUCT PRESENTING: NORMAL
PISA TR MAX VEL: 2.33 M/S
PULM VEIN S/D RATIO: 1.13
PV PEAK D VEL: 0.46 M/S
PV PEAK S VEL: 0.52 M/S
RA MAJOR: 4.24 CM
RA PRESSURE: 3 MMHG
RA WIDTH: 2.75 CM
SINUS: 2.77 CM
STJ: 2.73 CM
STRESS ECHO POST EXERCISE DUR MIN: 4 MINUTES
STRESS ECHO POST EXERCISE DUR SEC: 19 SECONDS
SYSTOLIC BLOOD PRESSURE: 138 MMHG
TDI LATERAL: 0.09 M/S
TDI SEPTAL: 0.08 M/S
TDI: 0.09 M/S
TR MAX PG: 22 MMHG
TV REST PULMONARY ARTERY PRESSURE: 25 MMHG

## 2021-10-18 PROCEDURE — 93971 EXTREMITY STUDY: CPT | Mod: 26,RT,, | Performed by: RADIOLOGY

## 2021-10-18 PROCEDURE — 93971 US LOWER EXTREMITY VEINS RIGHT: ICD-10-PCS | Mod: 26,RT,, | Performed by: RADIOLOGY

## 2021-10-18 PROCEDURE — 93971 EXTREMITY STUDY: CPT | Mod: TC,PO,RT

## 2021-10-25 ENCOUNTER — PATIENT MESSAGE (OUTPATIENT)
Dept: HEMATOLOGY/ONCOLOGY | Facility: CLINIC | Age: 66
End: 2021-10-25

## 2021-10-25 ENCOUNTER — OFFICE VISIT (OUTPATIENT)
Dept: HEMATOLOGY/ONCOLOGY | Facility: CLINIC | Age: 66
End: 2021-10-25
Payer: COMMERCIAL

## 2021-10-25 ENCOUNTER — LAB VISIT (OUTPATIENT)
Dept: LAB | Facility: HOSPITAL | Age: 66
End: 2021-10-25
Attending: INTERNAL MEDICINE
Payer: COMMERCIAL

## 2021-10-25 VITALS
SYSTOLIC BLOOD PRESSURE: 105 MMHG | RESPIRATION RATE: 17 BRPM | DIASTOLIC BLOOD PRESSURE: 78 MMHG | HEART RATE: 79 BPM | TEMPERATURE: 98 F | OXYGEN SATURATION: 96 % | BODY MASS INDEX: 43.94 KG/M2 | WEIGHT: 248 LBS | HEIGHT: 63 IN

## 2021-10-25 DIAGNOSIS — I82.4Z1 ACUTE DEEP VEIN THROMBOSIS (DVT) OF DISTAL VEIN OF RIGHT LOWER EXTREMITY: ICD-10-CM

## 2021-10-25 DIAGNOSIS — I82.4Z1 ACUTE DEEP VEIN THROMBOSIS (DVT) OF DISTAL VEIN OF RIGHT LOWER EXTREMITY: Primary | ICD-10-CM

## 2021-10-25 LAB
ALBUMIN SERPL BCP-MCNC: 4 G/DL (ref 3.5–5.2)
ALP SERPL-CCNC: 84 U/L (ref 55–135)
ALT SERPL W/O P-5'-P-CCNC: 47 U/L (ref 10–44)
ANION GAP SERPL CALC-SCNC: 10 MMOL/L (ref 8–16)
AST SERPL-CCNC: 40 U/L (ref 10–40)
BASOPHILS # BLD AUTO: 0.03 K/UL (ref 0–0.2)
BASOPHILS NFR BLD: 0.4 % (ref 0–1.9)
BILIRUB SERPL-MCNC: 0.4 MG/DL (ref 0.1–1)
BUN SERPL-MCNC: 14 MG/DL (ref 8–23)
CALCIUM SERPL-MCNC: 9.9 MG/DL (ref 8.7–10.5)
CHLORIDE SERPL-SCNC: 106 MMOL/L (ref 95–110)
CO2 SERPL-SCNC: 26 MMOL/L (ref 23–29)
CREAT SERPL-MCNC: 0.8 MG/DL (ref 0.5–1.4)
DIFFERENTIAL METHOD: NORMAL
EOSINOPHIL # BLD AUTO: 0.1 K/UL (ref 0–0.5)
EOSINOPHIL NFR BLD: 0.8 % (ref 0–8)
ERYTHROCYTE [DISTWIDTH] IN BLOOD BY AUTOMATED COUNT: 12.9 % (ref 11.5–14.5)
EST. GFR  (AFRICAN AMERICAN): >60 ML/MIN/1.73 M^2
EST. GFR  (NON AFRICAN AMERICAN): >60 ML/MIN/1.73 M^2
GLUCOSE SERPL-MCNC: 89 MG/DL (ref 70–110)
HAPTOGLOB SERPL-MCNC: 232 MG/DL (ref 30–250)
HCT VFR BLD AUTO: 41.7 % (ref 37–48.5)
HCYS SERPL-SCNC: 9.9 UMOL/L (ref 4–15.5)
HGB BLD-MCNC: 13.5 G/DL (ref 12–16)
IMM GRANULOCYTES # BLD AUTO: 0.01 K/UL (ref 0–0.04)
IMM GRANULOCYTES NFR BLD AUTO: 0.1 % (ref 0–0.5)
LDH SERPL L TO P-CCNC: 190 U/L (ref 110–260)
LYMPHOCYTES # BLD AUTO: 1.8 K/UL (ref 1–4.8)
LYMPHOCYTES NFR BLD: 21.1 % (ref 18–48)
MCH RBC QN AUTO: 28.8 PG (ref 27–31)
MCHC RBC AUTO-ENTMCNC: 32.4 G/DL (ref 32–36)
MCV RBC AUTO: 89 FL (ref 82–98)
MONOCYTES # BLD AUTO: 0.5 K/UL (ref 0.3–1)
MONOCYTES NFR BLD: 5.3 % (ref 4–15)
NEUTROPHILS # BLD AUTO: 6.1 K/UL (ref 1.8–7.7)
NEUTROPHILS NFR BLD: 72.3 % (ref 38–73)
NRBC BLD-RTO: 0 /100 WBC
PLATELET # BLD AUTO: 225 K/UL (ref 150–450)
PMV BLD AUTO: 11.4 FL (ref 9.2–12.9)
POTASSIUM SERPL-SCNC: 4.2 MMOL/L (ref 3.5–5.1)
PROT SERPL-MCNC: 8 G/DL (ref 6–8.4)
RBC # BLD AUTO: 4.69 M/UL (ref 4–5.4)
SODIUM SERPL-SCNC: 142 MMOL/L (ref 136–145)
WBC # BLD AUTO: 8.45 K/UL (ref 3.9–12.7)

## 2021-10-25 PROCEDURE — 85303 CLOT INHIBIT PROT C ACTIVITY: CPT | Performed by: INTERNAL MEDICINE

## 2021-10-25 PROCEDURE — 81240 F2 GENE: CPT | Performed by: INTERNAL MEDICINE

## 2021-10-25 PROCEDURE — 3008F PR BODY MASS INDEX (BMI) DOCUMENTED: ICD-10-PCS | Mod: CPTII,S$GLB,, | Performed by: INTERNAL MEDICINE

## 2021-10-25 PROCEDURE — 99214 PR OFFICE/OUTPT VISIT, EST, LEVL IV, 30-39 MIN: ICD-10-PCS | Mod: S$GLB,,, | Performed by: INTERNAL MEDICINE

## 2021-10-25 PROCEDURE — 86147 CARDIOLIPIN ANTIBODY EA IG: CPT | Performed by: INTERNAL MEDICINE

## 2021-10-25 PROCEDURE — 3288F FALL RISK ASSESSMENT DOCD: CPT | Mod: CPTII,S$GLB,, | Performed by: INTERNAL MEDICINE

## 2021-10-25 PROCEDURE — 80053 COMPREHEN METABOLIC PANEL: CPT | Mod: PN | Performed by: INTERNAL MEDICINE

## 2021-10-25 PROCEDURE — 1159F PR MEDICATION LIST DOCUMENTED IN MEDICAL RECORD: ICD-10-PCS | Mod: CPTII,S$GLB,, | Performed by: INTERNAL MEDICINE

## 2021-10-25 PROCEDURE — 83090 ASSAY OF HOMOCYSTEINE: CPT | Performed by: INTERNAL MEDICINE

## 2021-10-25 PROCEDURE — 1160F RVW MEDS BY RX/DR IN RCRD: CPT | Mod: CPTII,S$GLB,, | Performed by: INTERNAL MEDICINE

## 2021-10-25 PROCEDURE — 1126F AMNT PAIN NOTED NONE PRSNT: CPT | Mod: CPTII,S$GLB,, | Performed by: INTERNAL MEDICINE

## 2021-10-25 PROCEDURE — 30000890 MAYO MISCELLANEOUS TEST (REFLEX): Mod: 59 | Performed by: INTERNAL MEDICINE

## 2021-10-25 PROCEDURE — 86146 BETA-2 GLYCOPROTEIN ANTIBODY: CPT | Mod: 59 | Performed by: INTERNAL MEDICINE

## 2021-10-25 PROCEDURE — 3074F PR MOST RECENT SYSTOLIC BLOOD PRESSURE < 130 MM HG: ICD-10-PCS | Mod: CPTII,S$GLB,, | Performed by: INTERNAL MEDICINE

## 2021-10-25 PROCEDURE — 83010 ASSAY OF HAPTOGLOBIN QUANT: CPT | Performed by: INTERNAL MEDICINE

## 2021-10-25 PROCEDURE — 85613 RUSSELL VIPER VENOM DILUTED: CPT | Performed by: INTERNAL MEDICINE

## 2021-10-25 PROCEDURE — 85613 RUSSELL VIPER VENOM DILUTED: CPT | Mod: 91 | Performed by: INTERNAL MEDICINE

## 2021-10-25 PROCEDURE — 1101F PR PT FALLS ASSESS DOC 0-1 FALLS W/OUT INJ PAST YR: ICD-10-PCS | Mod: CPTII,S$GLB,, | Performed by: INTERNAL MEDICINE

## 2021-10-25 PROCEDURE — 99999 PR PBB SHADOW E&M-EST. PATIENT-LVL III: CPT | Mod: PBBFAC,,, | Performed by: INTERNAL MEDICINE

## 2021-10-25 PROCEDURE — 85732 THROMBOPLASTIN TIME PARTIAL: CPT | Performed by: INTERNAL MEDICINE

## 2021-10-25 PROCEDURE — 85300 ANTITHROMBIN III ACTIVITY: CPT | Performed by: INTERNAL MEDICINE

## 2021-10-25 PROCEDURE — 85025 COMPLETE CBC W/AUTO DIFF WBC: CPT | Mod: PN | Performed by: INTERNAL MEDICINE

## 2021-10-25 PROCEDURE — 83520 IMMUNOASSAY QUANT NOS NONAB: CPT | Mod: 59 | Performed by: INTERNAL MEDICINE

## 2021-10-25 PROCEDURE — 86148 ANTI-PHOSPHOLIPID ANTIBODY: CPT | Performed by: INTERNAL MEDICINE

## 2021-10-25 PROCEDURE — 1160F PR REVIEW ALL MEDS BY PRESCRIBER/CLIN PHARMACIST DOCUMENTED: ICD-10-PCS | Mod: CPTII,S$GLB,, | Performed by: INTERNAL MEDICINE

## 2021-10-25 PROCEDURE — 1101F PT FALLS ASSESS-DOCD LE1/YR: CPT | Mod: CPTII,S$GLB,, | Performed by: INTERNAL MEDICINE

## 2021-10-25 PROCEDURE — 3078F PR MOST RECENT DIASTOLIC BLOOD PRESSURE < 80 MM HG: ICD-10-PCS | Mod: CPTII,S$GLB,, | Performed by: INTERNAL MEDICINE

## 2021-10-25 PROCEDURE — 3008F BODY MASS INDEX DOCD: CPT | Mod: CPTII,S$GLB,, | Performed by: INTERNAL MEDICINE

## 2021-10-25 PROCEDURE — 99214 OFFICE O/P EST MOD 30 MIN: CPT | Mod: S$GLB,,, | Performed by: INTERNAL MEDICINE

## 2021-10-25 PROCEDURE — 1159F MED LIST DOCD IN RCRD: CPT | Mod: CPTII,S$GLB,, | Performed by: INTERNAL MEDICINE

## 2021-10-25 PROCEDURE — 3074F SYST BP LT 130 MM HG: CPT | Mod: CPTII,S$GLB,, | Performed by: INTERNAL MEDICINE

## 2021-10-25 PROCEDURE — 85670 THROMBIN TIME PLASMA: CPT | Performed by: INTERNAL MEDICINE

## 2021-10-25 PROCEDURE — 99999 PR PBB SHADOW E&M-EST. PATIENT-LVL III: ICD-10-PCS | Mod: PBBFAC,,, | Performed by: INTERNAL MEDICINE

## 2021-10-25 PROCEDURE — 86147 CARDIOLIPIN ANTIBODY EA IG: CPT | Mod: 59 | Performed by: INTERNAL MEDICINE

## 2021-10-25 PROCEDURE — 85305 CLOT INHIBIT PROT S TOTAL: CPT | Performed by: INTERNAL MEDICINE

## 2021-10-25 PROCEDURE — 30000890 MISCELLANEOUS SENDOUT TEST, BLOOD: Mod: 59 | Performed by: INTERNAL MEDICINE

## 2021-10-25 PROCEDURE — 3288F PR FALLS RISK ASSESSMENT DOCUMENTED: ICD-10-PCS | Mod: CPTII,S$GLB,, | Performed by: INTERNAL MEDICINE

## 2021-10-25 PROCEDURE — 1126F PR PAIN SEVERITY QUANTIFIED, NO PAIN PRESENT: ICD-10-PCS | Mod: CPTII,S$GLB,, | Performed by: INTERNAL MEDICINE

## 2021-10-25 PROCEDURE — 81241 F5 GENE: CPT | Performed by: INTERNAL MEDICINE

## 2021-10-25 PROCEDURE — 3078F DIAST BP <80 MM HG: CPT | Mod: CPTII,S$GLB,, | Performed by: INTERNAL MEDICINE

## 2021-10-25 PROCEDURE — 83615 LACTATE (LD) (LDH) ENZYME: CPT | Mod: PN | Performed by: INTERNAL MEDICINE

## 2021-10-26 ENCOUNTER — PATIENT MESSAGE (OUTPATIENT)
Dept: HEMATOLOGY/ONCOLOGY | Facility: CLINIC | Age: 66
End: 2021-10-26
Payer: COMMERCIAL

## 2021-10-28 LAB
APTT IMM NP PPP: 43 SEC (ref 32–48)
APTT P HEP NEUT PPP: ABNORMAL SEC (ref 32–48)
AT III ACT/NOR PPP CHRO: 129 % (ref 83–118)
B2 GLYCOPROT1 IGA SER QL: <9 SAU
B2 GLYCOPROT1 IGG SER QL: <9 SGU
B2 GLYCOPROT1 IGM SER QL: <9 SMU
CARDIOLIPIN IGG SER IA-ACNC: <9.4 GPL (ref 0–14.99)
CARDIOLIPIN IGM SER IA-ACNC: 24.2 MPL (ref 0–12.49)
CONFIRM APTT STACLOT: ABNORMAL
DRVVT SCREEN TO CONFIRM RATIO: ABNORMAL RATIO
LA 3 SCREEN W REFLEX-IMP: ABNORMAL
LA NT DPL PPP QL: ABNORMAL
MIXING DRVVT: 43 SEC (ref 33–44)
PROT C ACT/NOR PPP CHRO: 123 % (ref 70–150)
PROT S ACT/NOR PPP: 115 % (ref 65–160)
PROTHROMBIN TIME: 14.2 SEC (ref 12–15.5)
REPTILASE TIME: ABNORMAL SEC
SCREEN APTT: 50 SEC (ref 32–48)
SCREEN DRVVT: 51 SEC (ref 33–44)
THROMBIN TIME: 16.3 SEC (ref 14.7–19.5)

## 2021-10-29 LAB — CARDIOLIPIN IGA SER IA-ACNC: <9 APL

## 2021-10-31 LAB
F2 C.20210G>A GENO BLD/T: NEGATIVE
F2 GENE MUT ANL BLD/T: NORMAL
F5 GENE MUT ANL BLD/T: NORMAL
F5 P.R506Q BLD/T QL: NEGATIVE
PS IGA SER-ACNC: <20 U/ML
PS IGG SER-ACNC: <10 U/ML
PS IGM SER-ACNC: 28 U/ML

## 2021-11-02 ENCOUNTER — HOSPITAL ENCOUNTER (OUTPATIENT)
Dept: RADIOLOGY | Facility: HOSPITAL | Age: 66
Discharge: HOME | End: 2021-11-02
Attending: INTERNAL MEDICINE
Payer: COMMERCIAL

## 2021-11-02 DIAGNOSIS — I82.4Z1 ACUTE DEEP VEIN THROMBOSIS (DVT) OF DISTAL VEIN OF RIGHT LOWER EXTREMITY: ICD-10-CM

## 2021-11-02 PROCEDURE — 74177 CT ABD & PELVIS W/CONTRAST: CPT | Mod: 26,,, | Performed by: RADIOLOGY

## 2021-11-02 PROCEDURE — 71260 CT CHEST ABDOMEN PELVIS WITH CONTRAST (XPD): ICD-10-PCS | Mod: 26,,, | Performed by: RADIOLOGY

## 2021-11-02 PROCEDURE — 25500020 PHARM REV CODE 255: Mod: PO | Performed by: INTERNAL MEDICINE

## 2021-11-02 PROCEDURE — 74177 CT ABD & PELVIS W/CONTRAST: CPT | Mod: TC,PO

## 2021-11-02 PROCEDURE — 71260 CT THORAX DX C+: CPT | Mod: TC,PO

## 2021-11-02 PROCEDURE — A9698 NON-RAD CONTRAST MATERIALNOC: HCPCS | Mod: PO | Performed by: INTERNAL MEDICINE

## 2021-11-02 PROCEDURE — 71260 CT THORAX DX C+: CPT | Mod: 26,,, | Performed by: RADIOLOGY

## 2021-11-02 PROCEDURE — 74177 CT CHEST ABDOMEN PELVIS WITH CONTRAST (XPD): ICD-10-PCS | Mod: 26,,, | Performed by: RADIOLOGY

## 2021-11-02 RX ADMIN — IOHEXOL 1000 ML: 12 SOLUTION ORAL at 09:11

## 2021-11-02 RX ADMIN — IOHEXOL 100 ML: 350 INJECTION, SOLUTION INTRAVENOUS at 09:11

## 2021-11-03 LAB — MAYO MISCELLANEOUS RESULT (REF): NORMAL

## 2021-11-04 LAB
MISCELLANEOUS TEST NAME: NORMAL
MISCELLANEOUS TEST NAME: NORMAL
REFERENCE LAB: NORMAL
REFERENCE LAB: NORMAL
SPECIMEN TYPE: NORMAL
SPECIMEN TYPE: NORMAL
TEST RESULT: NORMAL
TEST RESULT: NORMAL

## 2021-11-05 ENCOUNTER — PATIENT MESSAGE (OUTPATIENT)
Dept: HEMATOLOGY/ONCOLOGY | Facility: CLINIC | Age: 66
End: 2021-11-05

## 2021-11-05 ENCOUNTER — OFFICE VISIT (OUTPATIENT)
Dept: HEMATOLOGY/ONCOLOGY | Facility: CLINIC | Age: 66
End: 2021-11-05
Payer: COMMERCIAL

## 2021-11-05 VITALS
HEART RATE: 65 BPM | HEIGHT: 63 IN | BODY MASS INDEX: 44.65 KG/M2 | DIASTOLIC BLOOD PRESSURE: 82 MMHG | WEIGHT: 252 LBS | TEMPERATURE: 98 F | RESPIRATION RATE: 18 BRPM | OXYGEN SATURATION: 98 % | SYSTOLIC BLOOD PRESSURE: 120 MMHG

## 2021-11-05 DIAGNOSIS — I82.401 ACUTE DEEP VEIN THROMBOSIS (DVT) OF RIGHT LOWER EXTREMITY, UNSPECIFIED VEIN: Primary | ICD-10-CM

## 2021-11-05 PROCEDURE — 99214 OFFICE O/P EST MOD 30 MIN: CPT | Mod: S$GLB,,, | Performed by: INTERNAL MEDICINE

## 2021-11-05 PROCEDURE — 1126F AMNT PAIN NOTED NONE PRSNT: CPT | Mod: CPTII,S$GLB,, | Performed by: INTERNAL MEDICINE

## 2021-11-05 PROCEDURE — 1160F PR REVIEW ALL MEDS BY PRESCRIBER/CLIN PHARMACIST DOCUMENTED: ICD-10-PCS | Mod: CPTII,S$GLB,, | Performed by: INTERNAL MEDICINE

## 2021-11-05 PROCEDURE — 99999 PR PBB SHADOW E&M-EST. PATIENT-LVL III: CPT | Mod: PBBFAC,,, | Performed by: INTERNAL MEDICINE

## 2021-11-05 PROCEDURE — 3074F PR MOST RECENT SYSTOLIC BLOOD PRESSURE < 130 MM HG: ICD-10-PCS | Mod: CPTII,S$GLB,, | Performed by: INTERNAL MEDICINE

## 2021-11-05 PROCEDURE — 3079F DIAST BP 80-89 MM HG: CPT | Mod: CPTII,S$GLB,, | Performed by: INTERNAL MEDICINE

## 2021-11-05 PROCEDURE — 99214 PR OFFICE/OUTPT VISIT, EST, LEVL IV, 30-39 MIN: ICD-10-PCS | Mod: S$GLB,,, | Performed by: INTERNAL MEDICINE

## 2021-11-05 PROCEDURE — 3074F SYST BP LT 130 MM HG: CPT | Mod: CPTII,S$GLB,, | Performed by: INTERNAL MEDICINE

## 2021-11-05 PROCEDURE — 3008F BODY MASS INDEX DOCD: CPT | Mod: CPTII,S$GLB,, | Performed by: INTERNAL MEDICINE

## 2021-11-05 PROCEDURE — 3079F PR MOST RECENT DIASTOLIC BLOOD PRESSURE 80-89 MM HG: ICD-10-PCS | Mod: CPTII,S$GLB,, | Performed by: INTERNAL MEDICINE

## 2021-11-05 PROCEDURE — 99999 PR PBB SHADOW E&M-EST. PATIENT-LVL III: ICD-10-PCS | Mod: PBBFAC,,, | Performed by: INTERNAL MEDICINE

## 2021-11-05 PROCEDURE — 1160F RVW MEDS BY RX/DR IN RCRD: CPT | Mod: CPTII,S$GLB,, | Performed by: INTERNAL MEDICINE

## 2021-11-05 PROCEDURE — 1126F PR PAIN SEVERITY QUANTIFIED, NO PAIN PRESENT: ICD-10-PCS | Mod: CPTII,S$GLB,, | Performed by: INTERNAL MEDICINE

## 2021-11-05 PROCEDURE — 3288F PR FALLS RISK ASSESSMENT DOCUMENTED: ICD-10-PCS | Mod: CPTII,S$GLB,, | Performed by: INTERNAL MEDICINE

## 2021-11-05 PROCEDURE — 1101F PT FALLS ASSESS-DOCD LE1/YR: CPT | Mod: CPTII,S$GLB,, | Performed by: INTERNAL MEDICINE

## 2021-11-05 PROCEDURE — 1101F PR PT FALLS ASSESS DOC 0-1 FALLS W/OUT INJ PAST YR: ICD-10-PCS | Mod: CPTII,S$GLB,, | Performed by: INTERNAL MEDICINE

## 2021-11-05 PROCEDURE — 1159F MED LIST DOCD IN RCRD: CPT | Mod: CPTII,S$GLB,, | Performed by: INTERNAL MEDICINE

## 2021-11-05 PROCEDURE — 3008F PR BODY MASS INDEX (BMI) DOCUMENTED: ICD-10-PCS | Mod: CPTII,S$GLB,, | Performed by: INTERNAL MEDICINE

## 2021-11-05 PROCEDURE — 3288F FALL RISK ASSESSMENT DOCD: CPT | Mod: CPTII,S$GLB,, | Performed by: INTERNAL MEDICINE

## 2021-11-05 PROCEDURE — 1159F PR MEDICATION LIST DOCUMENTED IN MEDICAL RECORD: ICD-10-PCS | Mod: CPTII,S$GLB,, | Performed by: INTERNAL MEDICINE

## 2021-11-05 RX ORDER — TRAMADOL HYDROCHLORIDE 50 MG/1
TABLET ORAL
COMMUNITY
Start: 2021-10-30 | End: 2022-08-25

## 2021-11-05 RX ORDER — AMOXICILLIN 500 MG/1
CAPSULE ORAL
COMMUNITY
Start: 2021-10-30 | End: 2022-08-25

## 2021-11-30 RX ORDER — APIXABAN 5 MG/1
TABLET, FILM COATED ORAL
Qty: 60 TABLET | Refills: 2 | Status: SHIPPED | OUTPATIENT
Start: 2021-11-30 | End: 2022-02-28

## 2022-01-03 ENCOUNTER — TELEPHONE (OUTPATIENT)
Dept: HEMATOLOGY/ONCOLOGY | Facility: CLINIC | Age: 67
End: 2022-01-03
Payer: COMMERCIAL

## 2022-01-03 NOTE — TELEPHONE ENCOUNTER
Spoke with patient, informed her that we are not doing covid antibody testing at this time     ----- Message from Jewell Wilcox, Patient Care Assistant sent at 1/3/2022  1:03 PM CST -----  Contact: isai  Type: Needs Medical Advice  Who Called:  isai  Symptoms (please be specific):  na  How long has patient had these symptoms:  na  Pharmacy name and phone #:  na  Best Call Back Number: [phone]    Additional Information: patient has questions about  upcoming test , and if it approved , please call patient to discuss further,  patient would also like to know if you can test for anti body covid test  to see if she has had it,

## 2022-01-11 ENCOUNTER — PATIENT MESSAGE (OUTPATIENT)
Dept: FAMILY MEDICINE | Facility: CLINIC | Age: 67
End: 2022-01-11

## 2022-01-11 ENCOUNTER — CLINICAL SUPPORT (OUTPATIENT)
Dept: FAMILY MEDICINE | Facility: CLINIC | Age: 67
End: 2022-01-11
Payer: COMMERCIAL

## 2022-01-11 ENCOUNTER — TELEPHONE (OUTPATIENT)
Dept: FAMILY MEDICINE | Facility: CLINIC | Age: 67
End: 2022-01-11

## 2022-01-11 DIAGNOSIS — R68.83 CHILLS: ICD-10-CM

## 2022-01-11 DIAGNOSIS — R05.9 COUGH: ICD-10-CM

## 2022-01-11 DIAGNOSIS — R05.9 COUGH: Primary | ICD-10-CM

## 2022-01-11 DIAGNOSIS — U07.1 COVID-19 VIRUS INFECTION: Primary | ICD-10-CM

## 2022-01-11 LAB
CTP QC/QA: YES
SARS-COV-2 RDRP RESP QL NAA+PROBE: POSITIVE

## 2022-01-11 PROCEDURE — U0002: ICD-10-PCS | Mod: QW,S$GLB,, | Performed by: FAMILY MEDICINE

## 2022-01-11 PROCEDURE — U0002 COVID-19 LAB TEST NON-CDC: HCPCS | Mod: QW,S$GLB,, | Performed by: FAMILY MEDICINE

## 2022-01-11 NOTE — TELEPHONE ENCOUNTER
----- Message from Mitra Anguiano sent at 1/11/2022 12:59 PM CST -----  .Type:  Needs Medical Advice    Who Called: ANGEL SCHUSTER [[id number]]  Symptoms (please be specific):   How long has patient had these symptoms:    Pharmacy name and phone #:    Would the patient rather a call back or a response via [facility]?   Best Call Back Number:  [phone]   Additional Information:  pt is requesting a covid test. Pt states have a slight fever and headache. Please call

## 2022-01-11 NOTE — TELEPHONE ENCOUNTER
PT WAS ADVISED OF COVID RESULTS AND RECOMMENDATIONS. PT VERBALIZED UNDERSTANDING . SHE WILL CALL THE OFFICE WITH ANY QUESTIONS OR CONCERNS.

## 2022-01-11 NOTE — TELEPHONE ENCOUNTER
COVID-19 test positive.  Please check to see how patient is doing.  Order placed for home monitoring program.  Patients Covid risk score is  2  on risk scale ranging from 0-16.  Higher numbers indicate more risk of developing severe disease.  If Covid risk score is 3 or greater then is eligible for monoclonal antibody infusion, therefore not eligible.  Recommend symptomatic treatment with over-the-counter cold medications and Tylenol  With follow-up for worsening symptom.

## 2022-01-11 NOTE — PROGRESS NOTES
Patient presented to the office today for COVID-19 testing. Patient was swabbed and tolerated testing well.

## 2022-01-28 ENCOUNTER — LAB VISIT (OUTPATIENT)
Dept: LAB | Facility: HOSPITAL | Age: 67
End: 2022-01-28
Attending: INTERNAL MEDICINE
Payer: COMMERCIAL

## 2022-01-28 DIAGNOSIS — I82.401 ACUTE DEEP VEIN THROMBOSIS (DVT) OF RIGHT LOWER EXTREMITY, UNSPECIFIED VEIN: ICD-10-CM

## 2022-01-28 PROCEDURE — 86148 ANTI-PHOSPHOLIPID ANTIBODY: CPT | Mod: 91 | Performed by: INTERNAL MEDICINE

## 2022-01-28 PROCEDURE — 83520 IMMUNOASSAY QUANT NOS NONAB: CPT | Mod: 59 | Performed by: INTERNAL MEDICINE

## 2022-01-28 PROCEDURE — 36415 COLL VENOUS BLD VENIPUNCTURE: CPT | Mod: PO | Performed by: INTERNAL MEDICINE

## 2022-01-28 PROCEDURE — 86147 CARDIOLIPIN ANTIBODY EA IG: CPT | Mod: 59 | Performed by: INTERNAL MEDICINE

## 2022-01-28 PROCEDURE — 30000890 MAYO MISCELLANEOUS TEST (REFLEX): Mod: 59 | Performed by: INTERNAL MEDICINE

## 2022-01-31 ENCOUNTER — TELEPHONE (OUTPATIENT)
Dept: HEMATOLOGY/ONCOLOGY | Facility: CLINIC | Age: 67
End: 2022-01-31
Payer: COMMERCIAL

## 2022-01-31 LAB
CARDIOLIPIN IGG SER IA-ACNC: <9.4 GPL (ref 0–14.99)
CARDIOLIPIN IGM SER IA-ACNC: 27.2 MPL (ref 0–12.49)

## 2022-02-01 ENCOUNTER — TELEPHONE (OUTPATIENT)
Dept: HEMATOLOGY/ONCOLOGY | Facility: CLINIC | Age: 67
End: 2022-02-01
Payer: COMMERCIAL

## 2022-02-01 NOTE — TELEPHONE ENCOUNTER
Called patient, she advised that she would like a earlier for Wed 02/02 appt, due to her aunt having a earlier appt. And they ride together, appt time was changed, verified new time for appt.       ----- Message from Willow Salomon sent at 2/1/2022  8:42 AM CST -----  Type: Needs Medical Advice  Who Called: Patient   Symptoms (please be specific):    How long has patient had these symptoms:    Pharmacy name and phone #:    Best Call Back Number: [phone]  Additional Information: Patient is requesting a call back from [facility] regarding appts.

## 2022-02-02 ENCOUNTER — TELEPHONE (OUTPATIENT)
Dept: HEMATOLOGY/ONCOLOGY | Facility: CLINIC | Age: 67
End: 2022-02-02
Payer: COMMERCIAL

## 2022-02-02 NOTE — TELEPHONE ENCOUNTER
Spoke with patient, appointment rescheduled for 2/15 at 3pm with Dr. Tillman, patient states understanding

## 2022-02-09 LAB
PS IGA SER-ACNC: <20 U/ML
PS IGG SER-ACNC: 11 U/ML
PS IGM SER-ACNC: ABNORMAL U/ML

## 2022-02-10 LAB — MAYO MISCELLANEOUS RESULT (REF): NORMAL

## 2022-02-15 ENCOUNTER — OFFICE VISIT (OUTPATIENT)
Dept: HEMATOLOGY/ONCOLOGY | Facility: CLINIC | Age: 67
End: 2022-02-15
Payer: COMMERCIAL

## 2022-02-15 VITALS
HEART RATE: 94 BPM | WEIGHT: 246.5 LBS | HEIGHT: 63 IN | OXYGEN SATURATION: 95 % | DIASTOLIC BLOOD PRESSURE: 83 MMHG | SYSTOLIC BLOOD PRESSURE: 142 MMHG | RESPIRATION RATE: 16 BRPM | TEMPERATURE: 98 F | BODY MASS INDEX: 43.68 KG/M2

## 2022-02-15 DIAGNOSIS — I82.401 ACUTE DEEP VEIN THROMBOSIS (DVT) OF RIGHT LOWER EXTREMITY, UNSPECIFIED VEIN: Primary | ICD-10-CM

## 2022-02-15 DIAGNOSIS — D68.59 THROMBOPHILIA: ICD-10-CM

## 2022-02-15 PROCEDURE — 1160F RVW MEDS BY RX/DR IN RCRD: CPT | Mod: CPTII,S$GLB,, | Performed by: INTERNAL MEDICINE

## 2022-02-15 PROCEDURE — 1101F PT FALLS ASSESS-DOCD LE1/YR: CPT | Mod: CPTII,S$GLB,, | Performed by: INTERNAL MEDICINE

## 2022-02-15 PROCEDURE — 99213 OFFICE O/P EST LOW 20 MIN: CPT | Mod: S$GLB,,, | Performed by: INTERNAL MEDICINE

## 2022-02-15 PROCEDURE — 1126F AMNT PAIN NOTED NONE PRSNT: CPT | Mod: CPTII,S$GLB,, | Performed by: INTERNAL MEDICINE

## 2022-02-15 PROCEDURE — 1160F PR REVIEW ALL MEDS BY PRESCRIBER/CLIN PHARMACIST DOCUMENTED: ICD-10-PCS | Mod: CPTII,S$GLB,, | Performed by: INTERNAL MEDICINE

## 2022-02-15 PROCEDURE — 3008F BODY MASS INDEX DOCD: CPT | Mod: CPTII,S$GLB,, | Performed by: INTERNAL MEDICINE

## 2022-02-15 PROCEDURE — 3288F FALL RISK ASSESSMENT DOCD: CPT | Mod: CPTII,S$GLB,, | Performed by: INTERNAL MEDICINE

## 2022-02-15 PROCEDURE — 99213 PR OFFICE/OUTPT VISIT, EST, LEVL III, 20-29 MIN: ICD-10-PCS | Mod: S$GLB,,, | Performed by: INTERNAL MEDICINE

## 2022-02-15 PROCEDURE — 3008F PR BODY MASS INDEX (BMI) DOCUMENTED: ICD-10-PCS | Mod: CPTII,S$GLB,, | Performed by: INTERNAL MEDICINE

## 2022-02-15 PROCEDURE — 1159F PR MEDICATION LIST DOCUMENTED IN MEDICAL RECORD: ICD-10-PCS | Mod: CPTII,S$GLB,, | Performed by: INTERNAL MEDICINE

## 2022-02-15 PROCEDURE — 99999 PR PBB SHADOW E&M-EST. PATIENT-LVL III: CPT | Mod: PBBFAC,,, | Performed by: INTERNAL MEDICINE

## 2022-02-15 PROCEDURE — 3077F SYST BP >= 140 MM HG: CPT | Mod: CPTII,S$GLB,, | Performed by: INTERNAL MEDICINE

## 2022-02-15 PROCEDURE — 3288F PR FALLS RISK ASSESSMENT DOCUMENTED: ICD-10-PCS | Mod: CPTII,S$GLB,, | Performed by: INTERNAL MEDICINE

## 2022-02-15 PROCEDURE — 3077F PR MOST RECENT SYSTOLIC BLOOD PRESSURE >= 140 MM HG: ICD-10-PCS | Mod: CPTII,S$GLB,, | Performed by: INTERNAL MEDICINE

## 2022-02-15 PROCEDURE — 1159F MED LIST DOCD IN RCRD: CPT | Mod: CPTII,S$GLB,, | Performed by: INTERNAL MEDICINE

## 2022-02-15 PROCEDURE — 1101F PR PT FALLS ASSESS DOC 0-1 FALLS W/OUT INJ PAST YR: ICD-10-PCS | Mod: CPTII,S$GLB,, | Performed by: INTERNAL MEDICINE

## 2022-02-15 PROCEDURE — 3079F DIAST BP 80-89 MM HG: CPT | Mod: CPTII,S$GLB,, | Performed by: INTERNAL MEDICINE

## 2022-02-15 PROCEDURE — 3079F PR MOST RECENT DIASTOLIC BLOOD PRESSURE 80-89 MM HG: ICD-10-PCS | Mod: CPTII,S$GLB,, | Performed by: INTERNAL MEDICINE

## 2022-02-15 PROCEDURE — 99999 PR PBB SHADOW E&M-EST. PATIENT-LVL III: ICD-10-PCS | Mod: PBBFAC,,, | Performed by: INTERNAL MEDICINE

## 2022-02-15 PROCEDURE — 1126F PR PAIN SEVERITY QUANTIFIED, NO PAIN PRESENT: ICD-10-PCS | Mod: CPTII,S$GLB,, | Performed by: INTERNAL MEDICINE

## 2022-02-15 NOTE — PROGRESS NOTES
History of present illness:  The patient is a 66-year-old white female with unprovoked right lower extremity deep venous thrombosis who is on anticoagulation Eliquis and returns to clinic to review results of interval ultrasound to document resolution.  Patient has been on anticoagulation for little more than 3 months.  No family history of thrombophilia.  Positive family history of breast cancer.    Patient was discovered to have elevated anticardiolipin antibody, antiphosphatidylserine antibody, and antiphosphatidylethanolamine antibodies on initial thrombophilia panel.  Patient returns to clinic to review results of interval workup to assess for chronicity.    Physical examination:  Well-developed, obese, white female, in no acute distress, who has a weight of 246.5 lb (decreased by 5.5 lb).  VITAL SIGNS: Documented in EMR and reviewed  HEENT: Normocephalic, atraumatic. Oral mucosa pink and moist. Lips without lesions. Tongue midline. Oropharynx clear. Nonicteric sclerae.   NECK: Supple, no adenopathy.  HEART: Regular rate and rhythm without murmur, gallop or rub.   LUNGS: Clear to auscultation bilaterally.  ABDOMEN: Soft, nontender, nondistended with positive normoactive bowel sounds, no hepatosplenomegaly.   EXTREMITIES: No cyanosis, clubbing or edema. Distal pulses are intact.     Laboratory:  Anticardiolipin antibody remains significantly elevated.  Antiphosphatidylethanolamine antibody panel has normalized.  Antiphosphatidylserine IgM fraction could not be repeated due to unavailability of the reagent necessary to run the test.      Impression:  1. 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    Plan:  1. Lifelong anticoagulation with Eliquis.  2. Return to clinic should patient suffer recurrent thrombosis while on therapy.    This note was created using voice recognition software and may contain grammatical errors.

## 2022-02-28 RX ORDER — APIXABAN 5 MG/1
TABLET, FILM COATED ORAL
Qty: 60 TABLET | Refills: 5 | Status: SHIPPED | OUTPATIENT
Start: 2022-02-28 | End: 2022-08-01 | Stop reason: SDUPTHER

## 2022-04-19 ENCOUNTER — TELEPHONE (OUTPATIENT)
Dept: HEMATOLOGY/ONCOLOGY | Facility: CLINIC | Age: 67
End: 2022-04-19
Payer: COMMERCIAL

## 2022-04-19 NOTE — TELEPHONE ENCOUNTER
Spoke with patient on eliquis and not sure if she needs to have a dental procedure or not. Her bridge fell out. Informed the patient that she needs to go to the dentist and see exactly what needs to be done and before any procedure to contact Dr. Tillman to determine if anything needs to be done with the eliquis.  Patient stated understanding and will let us know

## 2022-04-26 ENCOUNTER — TELEPHONE (OUTPATIENT)
Dept: HEMATOLOGY/ONCOLOGY | Facility: CLINIC | Age: 67
End: 2022-04-26
Payer: COMMERCIAL

## 2022-04-26 ENCOUNTER — PATIENT MESSAGE (OUTPATIENT)
Dept: ADMINISTRATIVE | Facility: HOSPITAL | Age: 67
End: 2022-04-26
Payer: COMMERCIAL

## 2022-04-26 NOTE — TELEPHONE ENCOUNTER
Advised patient I received form and will advised with Dr Tillman when he returns on Friday and fax back clearance. Patient verbalized understanding.  ----- Message from Jewlel Wilcox, Patient Care Assistant sent at 4/26/2022 11:52 AM CDT -----  Contact: isai  Type: Needs Medical Advice  Who Called:  isai  Zeke Call Back Number: [phone]    Additional Information: isai wants to know if the dentist of [facility] has contacted you about her blood thinners , she needs to get a tooth extraction, please call to further discuss, thank you

## 2022-05-02 DIAGNOSIS — D68.59 THROMBOPHILIA: Primary | ICD-10-CM

## 2022-05-02 RX ORDER — ENOXAPARIN SODIUM 100 MG/ML
160 INJECTION SUBCUTANEOUS DAILY
Qty: 5 EACH | Refills: 0 | Status: SHIPPED | OUTPATIENT
Start: 2022-05-02 | End: 2022-08-25

## 2022-05-02 NOTE — TELEPHONE ENCOUNTER
Advised patient of need to hold Eliquis for 5 days prior to dental extraction and bridge with Lovenox. Advised ok to restart Eliquis 24-48 hours post extractions. Patient verbalized understanding and will have her pcp office instruct on how to give injections.

## 2022-05-03 ENCOUNTER — TELEPHONE (OUTPATIENT)
Dept: HEMATOLOGY/ONCOLOGY | Facility: CLINIC | Age: 67
End: 2022-05-03
Payer: COMMERCIAL

## 2022-05-03 NOTE — TELEPHONE ENCOUNTER
Clarified rx with pharmacist.  ----- Message from Nunu Green sent at 5/3/2022  8:44 AM CDT -----  Regarding: regarding strength of medication enoxaparin (LOVENOX) 100 mg/mL Syrg  Type: Needs Medical Advice  Who Called:  Tammy Lara   Pharmacy name and phone #: [facility]  [phone]  Best Call Back Number: [phone]  Additional Information: caller request call back regarding quantity and strength of medication, pharmacy need instruction, there are two different strength written, enoxaparin (LOVENOX) 100 mg/mL Syrg, please advise.

## 2022-05-03 NOTE — TELEPHONE ENCOUNTER
See previous duplicate message----- Message from Valerie Harding RN sent at 5/2/2022  5:02 PM CDT -----    ----- Message -----  From: Ingrid Ojeda  Sent: 5/2/2022   5:00 PM CDT  To: Layne FREY Staff    Type:Needs Medical Advice    [facility] call back number:[phone]  Additional Info: Requesting a call back regarding  needs to clarify prescription.  Please Advise- Thank you

## 2022-05-03 NOTE — TELEPHONE ENCOUNTER
See other duplicate message----- Message from Valerie Harding RN sent at 5/2/2022  5:02 PM CDT -----    ----- Message -----  From: Ingrid Ojeda  Sent: 5/2/2022   5:00 PM CDT  To: Layne FREY Staff    Type:Needs Medical Advice    Who Called:Tyree at [facility] in [facility] call back number:[phone]  Additional Info: Requesting a call back regarding  needs to clarify prescription.  Please Advise- Thank you

## 2022-05-04 ENCOUNTER — TELEPHONE (OUTPATIENT)
Dept: HEMATOLOGY/ONCOLOGY | Facility: CLINIC | Age: 67
End: 2022-05-04
Payer: COMMERCIAL

## 2022-05-04 NOTE — TELEPHONE ENCOUNTER
Clarified dosing and administration of lovenox with patient.   ----- Message from Ingrid Ojeda sent at 5/4/2022 12:39 PM CDT -----  Type:Needs Medical Advice    Who Called PT  Best call back number:[phone]  Additional Info: Requesting a call back regarding, PT has some questions about switching to the inj of blood thinners for prep for a dental procedure. Please call to discuss.  Please Advise- Thank you

## 2022-05-23 RX ORDER — DICYCLOMINE HYDROCHLORIDE 10 MG/1
CAPSULE ORAL
Qty: 90 CAPSULE | Refills: 1 | Status: SHIPPED | OUTPATIENT
Start: 2022-05-23 | End: 2022-12-03

## 2022-08-01 ENCOUNTER — TELEPHONE (OUTPATIENT)
Dept: FAMILY MEDICINE | Facility: CLINIC | Age: 67
End: 2022-08-01
Payer: COMMERCIAL

## 2022-08-01 NOTE — TELEPHONE ENCOUNTER
----- Message from Oxana Guajardo sent at 8/1/2022  1:19 PM CDT -----  Contact: ANGEL SCHUSTER [[id number]]  .Type:  Needs Medical Advice    Who Called: ANGEL SCHUSTER [[id number]]  Pharmacy name and phone #: .  [facility] [place] [facility] [address]  [address]  [place] [address]  Phone: [phone] Fax: [phone]   Would the patient rather a call back or a response via [facility]? Call   Best Call Back Number: .[phone] (home)    Additional Information: Pt is req a call back in regards to the medication Eliquis she states per her insurance company they are needing 90 day rx in order to fill this for the pt. She is req a call back in order to see will this affect the cost of the medication because this is an expensive and would like to speak with someone in regards to this.Pt states that she is almost out of this medication. Thanks AW

## 2022-08-01 NOTE — TELEPHONE ENCOUNTER
----- Message from Ami Montelongo sent at 8/1/2022  2:23 PM CDT -----  Pt is calling in regards to needing a 90 day supply of ELIQUIS 5 mg Tab. Pt states that hr insurance require a 90 day supply. Pt states that she take 2 pills a day. Pt can be reached at [phone] (home)                 [facility] [place], [place] - [address]  [address]  [place] [address]  Phone: [phone] Fax: [phone]

## 2022-08-01 NOTE — TELEPHONE ENCOUNTER
Patient reports pharmacy staff told her the insurance company is requesting a 90 day supply on Eliquis but she thinks it will be too expensive.  Informed her to contact insurance company and let us know if 90 day required and we can advise Dr Hansen.

## 2022-08-09 ENCOUNTER — PATIENT MESSAGE (OUTPATIENT)
Dept: ADMINISTRATIVE | Facility: HOSPITAL | Age: 67
End: 2022-08-09
Payer: COMMERCIAL

## 2022-08-25 ENCOUNTER — TELEPHONE (OUTPATIENT)
Dept: FAMILY MEDICINE | Facility: CLINIC | Age: 67
End: 2022-08-25
Payer: COMMERCIAL

## 2022-08-25 ENCOUNTER — OFFICE VISIT (OUTPATIENT)
Dept: FAMILY MEDICINE | Facility: CLINIC | Age: 67
End: 2022-08-25
Payer: COMMERCIAL

## 2022-08-25 ENCOUNTER — LAB VISIT (OUTPATIENT)
Dept: LAB | Facility: HOSPITAL | Age: 67
End: 2022-08-25
Attending: FAMILY MEDICINE
Payer: COMMERCIAL

## 2022-08-25 ENCOUNTER — PATIENT MESSAGE (OUTPATIENT)
Dept: FAMILY MEDICINE | Facility: CLINIC | Age: 67
End: 2022-08-25

## 2022-08-25 VITALS
SYSTOLIC BLOOD PRESSURE: 110 MMHG | HEART RATE: 68 BPM | DIASTOLIC BLOOD PRESSURE: 72 MMHG | WEIGHT: 254.31 LBS | TEMPERATURE: 98 F | BODY MASS INDEX: 45.06 KG/M2 | HEIGHT: 63 IN

## 2022-08-25 DIAGNOSIS — B37.31 VAGINAL YEAST INFECTION: ICD-10-CM

## 2022-08-25 DIAGNOSIS — Z00.00 ROUTINE HISTORY AND PHYSICAL EXAMINATION OF ADULT: Primary | ICD-10-CM

## 2022-08-25 DIAGNOSIS — D68.59 THROMBOPHILIA: ICD-10-CM

## 2022-08-25 DIAGNOSIS — R76.0 ANTICARDIOLIPIN ANTIBODY POSITIVE: ICD-10-CM

## 2022-08-25 DIAGNOSIS — Z12.11 SCREENING FOR COLON CANCER: ICD-10-CM

## 2022-08-25 DIAGNOSIS — F33.40 RECURRENT MAJOR DEPRESSIVE DISORDER, IN REMISSION: ICD-10-CM

## 2022-08-25 DIAGNOSIS — B96.89 BACTERIAL VAGINOSIS: ICD-10-CM

## 2022-08-25 DIAGNOSIS — N89.8 VAGINAL DISCHARGE: ICD-10-CM

## 2022-08-25 DIAGNOSIS — Z79.01 ANTICOAGULATED: ICD-10-CM

## 2022-08-25 DIAGNOSIS — N76.0 BACTERIAL VAGINOSIS: ICD-10-CM

## 2022-08-25 DIAGNOSIS — E66.01 OBESITY, MORBID, BMI 40.0-49.9: ICD-10-CM

## 2022-08-25 DIAGNOSIS — R10.2 SUPRAPUBIC DISCOMFORT: ICD-10-CM

## 2022-08-25 DIAGNOSIS — Z00.00 ROUTINE HISTORY AND PHYSICAL EXAMINATION OF ADULT: ICD-10-CM

## 2022-08-25 DIAGNOSIS — Z12.31 ENCOUNTER FOR SCREENING MAMMOGRAM FOR MALIGNANT NEOPLASM OF BREAST: ICD-10-CM

## 2022-08-25 LAB
ALBUMIN SERPL BCP-MCNC: 3.9 G/DL (ref 3.5–5.2)
ALP SERPL-CCNC: 90 U/L (ref 55–135)
ALT SERPL W/O P-5'-P-CCNC: 72 U/L (ref 10–44)
ANION GAP SERPL CALC-SCNC: 6 MMOL/L (ref 8–16)
AST SERPL-CCNC: 70 U/L (ref 10–40)
BACTERIA #/AREA URNS HPF: ABNORMAL /HPF
BACTERIA GENITAL QL WET PREP: ABNORMAL
BASOPHILS # BLD AUTO: 0.03 K/UL (ref 0–0.2)
BASOPHILS NFR BLD: 0.4 % (ref 0–1.9)
BILIRUB SERPL-MCNC: 0.6 MG/DL (ref 0.1–1)
BILIRUB UR QL STRIP: NEGATIVE
BUN SERPL-MCNC: 15 MG/DL (ref 8–23)
CALCIUM SERPL-MCNC: 9.4 MG/DL (ref 8.7–10.5)
CHLORIDE SERPL-SCNC: 107 MMOL/L (ref 95–110)
CHOLEST SERPL-MCNC: 181 MG/DL (ref 120–199)
CHOLEST/HDLC SERPL: 3 {RATIO} (ref 2–5)
CLARITY UR: CLEAR
CLUE CELLS VAG QL WET PREP: ABNORMAL
CO2 SERPL-SCNC: 28 MMOL/L (ref 23–29)
COLOR UR: YELLOW
CREAT SERPL-MCNC: 0.8 MG/DL (ref 0.5–1.4)
DIFFERENTIAL METHOD: NORMAL
EOSINOPHIL # BLD AUTO: 0.1 K/UL (ref 0–0.5)
EOSINOPHIL NFR BLD: 0.7 % (ref 0–8)
ERYTHROCYTE [DISTWIDTH] IN BLOOD BY AUTOMATED COUNT: 13.1 % (ref 11.5–14.5)
EST. GFR  (NO RACE VARIABLE): >60 ML/MIN/1.73 M^2
FILAMENT FUNGI VAG WET PREP-#/AREA: ABNORMAL
GLUCOSE SERPL-MCNC: 92 MG/DL (ref 70–110)
GLUCOSE UR QL STRIP: NEGATIVE
HCT VFR BLD AUTO: 41 % (ref 37–48.5)
HDLC SERPL-MCNC: 60 MG/DL (ref 40–75)
HDLC SERPL: 33.1 % (ref 20–50)
HGB BLD-MCNC: 13.3 G/DL (ref 12–16)
HGB UR QL STRIP: ABNORMAL
IMM GRANULOCYTES # BLD AUTO: 0.02 K/UL (ref 0–0.04)
IMM GRANULOCYTES NFR BLD AUTO: 0.3 % (ref 0–0.5)
KETONES UR QL STRIP: NEGATIVE
LDLC SERPL CALC-MCNC: 104.6 MG/DL (ref 63–159)
LEUKOCYTE ESTERASE UR QL STRIP: ABNORMAL
LYMPHOCYTES # BLD AUTO: 1.8 K/UL (ref 1–4.8)
LYMPHOCYTES NFR BLD: 24.2 % (ref 18–48)
MCH RBC QN AUTO: 28.5 PG (ref 27–31)
MCHC RBC AUTO-ENTMCNC: 32.4 G/DL (ref 32–36)
MCV RBC AUTO: 88 FL (ref 82–98)
MICROSCOPIC COMMENT: ABNORMAL
MONOCYTES # BLD AUTO: 0.4 K/UL (ref 0.3–1)
MONOCYTES NFR BLD: 5.1 % (ref 4–15)
NEUTROPHILS # BLD AUTO: 5.1 K/UL (ref 1.8–7.7)
NEUTROPHILS NFR BLD: 69.3 % (ref 38–73)
NITRITE UR QL STRIP: NEGATIVE
NONHDLC SERPL-MCNC: 121 MG/DL
NRBC BLD-RTO: 0 /100 WBC
PH UR STRIP: 6 [PH] (ref 5–8)
PLATELET # BLD AUTO: 243 K/UL (ref 150–450)
PMV BLD AUTO: 11.6 FL (ref 9.2–12.9)
POTASSIUM SERPL-SCNC: 4.8 MMOL/L (ref 3.5–5.1)
PROT SERPL-MCNC: 7.4 G/DL (ref 6–8.4)
PROT UR QL STRIP: NEGATIVE
RBC # BLD AUTO: 4.66 M/UL (ref 4–5.4)
RBC #/AREA URNS HPF: 2 /HPF (ref 0–4)
SODIUM SERPL-SCNC: 141 MMOL/L (ref 136–145)
SP GR UR STRIP: 1.03 (ref 1–1.03)
SPECIMEN SOURCE: ABNORMAL
SQUAMOUS #/AREA URNS HPF: 4 /HPF
T VAGINALIS GENITAL QL WET PREP: ABNORMAL
TRIGL SERPL-MCNC: 82 MG/DL (ref 30–150)
URN SPEC COLLECT METH UR: ABNORMAL
WBC # BLD AUTO: 7.3 K/UL (ref 3.9–12.7)
WBC #/AREA URNS HPF: 3 /HPF (ref 0–5)
WBC #/AREA VAG WET PREP: ABNORMAL
YEAST GENITAL QL WET PREP: ABNORMAL

## 2022-08-25 PROCEDURE — 3288F FALL RISK ASSESSMENT DOCD: CPT | Mod: CPTII,S$GLB,, | Performed by: FAMILY MEDICINE

## 2022-08-25 PROCEDURE — 99397 PER PM REEVAL EST PAT 65+ YR: CPT | Mod: 25,S$GLB,, | Performed by: FAMILY MEDICINE

## 2022-08-25 PROCEDURE — 3288F PR FALLS RISK ASSESSMENT DOCUMENTED: ICD-10-PCS | Mod: CPTII,S$GLB,, | Performed by: FAMILY MEDICINE

## 2022-08-25 PROCEDURE — 87210 SMEAR WET MOUNT SALINE/INK: CPT | Mod: PO | Performed by: FAMILY MEDICINE

## 2022-08-25 PROCEDURE — 99999 PR PBB SHADOW E&M-EST. PATIENT-LVL IV: ICD-10-PCS | Mod: PBBFAC,,, | Performed by: FAMILY MEDICINE

## 2022-08-25 PROCEDURE — 99999 PR PBB SHADOW E&M-EST. PATIENT-LVL IV: CPT | Mod: PBBFAC,,, | Performed by: FAMILY MEDICINE

## 2022-08-25 PROCEDURE — 90750 HZV VACC RECOMBINANT IM: CPT | Mod: S$GLB,,, | Performed by: FAMILY MEDICINE

## 2022-08-25 PROCEDURE — 3074F SYST BP LT 130 MM HG: CPT | Mod: CPTII,S$GLB,, | Performed by: FAMILY MEDICINE

## 2022-08-25 PROCEDURE — 90471 ZOSTER RECOMBINANT VACCINE: ICD-10-PCS | Mod: S$GLB,,, | Performed by: FAMILY MEDICINE

## 2022-08-25 PROCEDURE — 90750 ZOSTER RECOMBINANT VACCINE: ICD-10-PCS | Mod: S$GLB,,, | Performed by: FAMILY MEDICINE

## 2022-08-25 PROCEDURE — 99397 PR PREVENTIVE VISIT,EST,65 & OVER: ICD-10-PCS | Mod: 25,S$GLB,, | Performed by: FAMILY MEDICINE

## 2022-08-25 PROCEDURE — 81000 URINALYSIS NONAUTO W/SCOPE: CPT | Mod: PO | Performed by: FAMILY MEDICINE

## 2022-08-25 PROCEDURE — 36415 COLL VENOUS BLD VENIPUNCTURE: CPT | Mod: PO | Performed by: FAMILY MEDICINE

## 2022-08-25 PROCEDURE — 90471 IMMUNIZATION ADMIN: CPT | Mod: S$GLB,,, | Performed by: FAMILY MEDICINE

## 2022-08-25 PROCEDURE — 3074F PR MOST RECENT SYSTOLIC BLOOD PRESSURE < 130 MM HG: ICD-10-PCS | Mod: CPTII,S$GLB,, | Performed by: FAMILY MEDICINE

## 2022-08-25 PROCEDURE — 1101F PR PT FALLS ASSESS DOC 0-1 FALLS W/OUT INJ PAST YR: ICD-10-PCS | Mod: CPTII,S$GLB,, | Performed by: FAMILY MEDICINE

## 2022-08-25 PROCEDURE — 1101F PT FALLS ASSESS-DOCD LE1/YR: CPT | Mod: CPTII,S$GLB,, | Performed by: FAMILY MEDICINE

## 2022-08-25 PROCEDURE — 3078F PR MOST RECENT DIASTOLIC BLOOD PRESSURE < 80 MM HG: ICD-10-PCS | Mod: CPTII,S$GLB,, | Performed by: FAMILY MEDICINE

## 2022-08-25 PROCEDURE — 1159F PR MEDICATION LIST DOCUMENTED IN MEDICAL RECORD: ICD-10-PCS | Mod: CPTII,S$GLB,, | Performed by: FAMILY MEDICINE

## 2022-08-25 PROCEDURE — 3078F DIAST BP <80 MM HG: CPT | Mod: CPTII,S$GLB,, | Performed by: FAMILY MEDICINE

## 2022-08-25 PROCEDURE — 3008F BODY MASS INDEX DOCD: CPT | Mod: CPTII,S$GLB,, | Performed by: FAMILY MEDICINE

## 2022-08-25 PROCEDURE — 80061 LIPID PANEL: CPT | Performed by: FAMILY MEDICINE

## 2022-08-25 PROCEDURE — 3008F PR BODY MASS INDEX (BMI) DOCUMENTED: ICD-10-PCS | Mod: CPTII,S$GLB,, | Performed by: FAMILY MEDICINE

## 2022-08-25 PROCEDURE — 85025 COMPLETE CBC W/AUTO DIFF WBC: CPT | Performed by: FAMILY MEDICINE

## 2022-08-25 PROCEDURE — 80053 COMPREHEN METABOLIC PANEL: CPT | Performed by: FAMILY MEDICINE

## 2022-08-25 PROCEDURE — 1159F MED LIST DOCD IN RCRD: CPT | Mod: CPTII,S$GLB,, | Performed by: FAMILY MEDICINE

## 2022-08-25 RX ORDER — METRONIDAZOLE 500 MG/1
500 TABLET ORAL 2 TIMES DAILY
Qty: 14 TABLET | Refills: 0 | Status: SHIPPED | OUTPATIENT
Start: 2022-08-25 | End: 2022-09-01

## 2022-08-25 RX ORDER — FLUCONAZOLE 150 MG/1
150 TABLET ORAL ONCE
Qty: 2 TABLET | Refills: 0 | Status: SHIPPED | OUTPATIENT
Start: 2022-08-25 | End: 2022-08-25

## 2022-08-25 NOTE — TELEPHONE ENCOUNTER
----- Message from Samantha Ennis sent at 8/25/2022  2:32 PM CDT -----  Contact: self  Type:  Patient Returning Call    Who Called:Charlene  Who Left Message for Patient:unknown   Does the patient know what this is regarding?:possibly test results   Would the patient rather a call back or a response via [facility]? Call back   Best Call Back Number:[phone]  Additional Information:    Adam

## 2022-08-25 NOTE — PROGRESS NOTES
Presents physical exam..  Had some mild suprapubic discomfort past day or so.  Thought she might have a UTI.  Denies dysuria.  She did notice some vaginal discharge and odor recently.  Denies sexual contact for years.  Morbid obesity reviewed.  Depression stable current medication.  Thrombophilia with previous DVT on anticoagulation.  Due for colon screening, declines colonoscopy.    Charlene was seen today for annual exam.    Diagnoses and all orders for this visit:    Routine history and physical examination of adult  -     CBC Auto Differential; Future  -     Comprehensive Metabolic Panel; Future  -     Lipid Panel; Future  -     Cologuard Screening (Multitarget Stool DNA); Future  -     Cologuard Screening (Multitarget Stool DNA)    Dysuria  -     Urinalysis, Reflex to Urine Culture Urine, Clean Catch  -     Mammo Digital Screening Bilat w/ Moose; Future    Obesity, morbid, BMI 40.0-49.9    Recurrent major depressive disorder, in remission    Thrombophilia  -     CBC Auto Differential; Future    Anticardiolipin antibody positive    Anticoagulated  -     CBC Auto Differential; Future    Screening for colon cancer  -     Cologuard Screening (Multitarget Stool DNA); Future  -     Cologuard Screening (Multitarget Stool DNA)    Encounter for screening mammogram for malignant neoplasm of breast  -     Mammo Digital Screening Bilat w/ Moose; Future    Vaginal discharge  -     Vaginal Screen    Bacterial vaginosis    Vaginal yeast infection    Other orders  -     Zoster Recombinant Vaccine  -     Zoster Recombinant Vaccine; Future  -     Zoster Recombinant Vaccine  -     Urinalysis Microscopic  -     metroNIDAZOLE (FLAGYL) 500 MG tablet; Take 1 tablet (500 mg total) by mouth 2 (two) times daily. for 7 days  -     fluconazole (DIFLUCAN) 150 MG Tab; Take 1 tablet (150 mg total) by mouth once. May repeat in 1 week if needed. for 1 dose    Vaginal screen shows clue cells and yeast.      Anticipatory guidance: Don't smoke.   Healthy diet and regular exercise recommended.  Continue other current medication          Past Medical History:  Past Medical History:   Diagnosis Date    Acute deep vein thrombosis (DVT) of distal vein of right lower extremity 7/14/2021    Anticardiolipin antibody positive 8/25/2022    Depression     Diverticulosis 9/23/2008    DJD (degenerative joint disease) of knee     Fibromyalgia     Gambling problem     IBS (irritable bowel syndrome)     Obesity, morbid, BMI 40.0-49.9     S/P colonoscopy 9/23/2008    Sensorineural hearing loss     left ear-Sudden    Thrombophilia 2/15/2022     Past Surgical History:   Procedure Laterality Date    APPENDECTOMY      CHOLECYSTECTOMY      COLONOSCOPY  09/08    repeat 5 yrs    FOOT NEUROMA SURGERY      HEEL SPUR SURGERY      HYSTERECTOMY      Fibroid, endometriosis. Ovaries intact    ROTATOR CUFF REPAIR Right     toe surgeries      Tonsillectomy       Review of patient's allergies indicates:   Allergen Reactions    Venomil wasp venom [venom-wasp]      Swelling and difficulty breathing     Current Outpatient Medications on File Prior to Visit   Medication Sig Dispense Refill    apixaban (ELIQUIS) 5 mg Tab Take 1 tablet (5 mg total) by mouth 2 (two) times daily. 180 tablet 0    dicyclomine (BENTYL) 10 MG capsule Take 1 capsule by mouth once daily 90 capsule 1    Lactobac no.41/Bifidobact no.7 (PROBIOTIC-10 ORAL) Take 1 capsule by mouth once daily.      PREVALITE 4 gram PwPk TAKE 1 PACKET BY MOUTH ONCE DAILY. (MIX IN WATER) 30 packet 11    venlafaxine (EFFEXOR-XR) 75 MG 24 hr capsule Take 1 capsule by mouth once daily 90 capsule 3    [DISCONTINUED] enoxaparin (LOVENOX) 100 mg/mL Syrg Inject 1.6 mLs (160 mg total) into the skin once daily at 6am. 5 each 0    [DISCONTINUED] amoxicillin (AMOXIL) 500 MG capsule       [DISCONTINUED] traMADoL (ULTRAM) 50 mg tablet        No current facility-administered medications on file prior to visit.     Social History  "    Socioeconomic History    Marital status: Single   Tobacco Use    Smoking status: Never Smoker    Smokeless tobacco: Never Used   Substance and Sexual Activity    Alcohol use: Yes     Comment: occasion    Drug use: No     Family History   Problem Relation Age of Onset    Diabetes Mother     Diabetes Father     Depression Father     Breast cancer Maternal Aunt          ROS:  GENERAL: No fever, chills,  or significant weight changes.   CARDIOVASCULAR: Denies chest pain, PND, orthopnea or reduced exercise tolerance.  ABDOMEN: Appetite fine. Denies diarrhea, , hematemesis or blood in stool.  URINARY: No flank pain, dysuria or hematuria.        Vitals:    08/25/22 1107   BP: 110/72   Pulse: 68   Temp: 97.7 °F (36.5 °C)   TempSrc: Temporal   Weight: 115.3 kg (254 lb 4.8 oz)   Height: 5' 3" (1.6 m)     Wt Readings from Last 3 Encounters:   08/25/22 115.3 kg (254 lb 4.8 oz)   02/15/22 111.8 kg (246 lb 7.6 oz)   11/05/21 114.3 kg (251 lb 15.8 oz)       OBJECTIVE:   APPEARANCE: Well nourished, well developed, in no acute distress.    HEAD: Normocephalic.  Atraumatic.  No sinus tenderness.  EYES:   Right eye: Pupil reactive.  Conjunctiva clear.    Left eye: Pupil reactive.  Conjunctiva clear.  EOMI.    EARS: TM's intact. Light reflex normal. No retraction or perforation.    NOSE:  clear.  MOUTH & THROAT:  No pharyngeal erythema or exudate. No lesions.  NECK: Supple. No bruits.  No JVD.  No cervical lymphadenopathy.  No thyromegaly.    CHEST: Breath sounds clear bilaterally.  Normal respiratory effort  CARDIOVASCULAR: Normal rate.  Regular rhythm.  No murmurs.  No rub.  No gallops.  ABDOMEN: Bowel sounds normal.  Soft.  No tenderness.  No organomegaly.  PERIPHERAL VASCULAR: No cyanosis.  No clubbing.  No edema.  NEUROLOGIC: No focal findings.  MENTAL STATUS: Alert.  Oriented x 3.  Genitourinary:  Normal external female genitalia with mild atrophic changes noted.  No significant discharge.  Specimen collected for " vaginal screen.  We did not use speculum due to patient discomfort.

## 2022-08-26 ENCOUNTER — TELEPHONE (OUTPATIENT)
Dept: FAMILY MEDICINE | Facility: CLINIC | Age: 67
End: 2022-08-26
Payer: COMMERCIAL

## 2022-08-29 ENCOUNTER — HOSPITAL ENCOUNTER (OUTPATIENT)
Dept: RADIOLOGY | Facility: HOSPITAL | Age: 67
Discharge: HOME | End: 2022-08-29
Attending: FAMILY MEDICINE
Payer: COMMERCIAL

## 2022-08-29 VITALS — BODY MASS INDEX: 45 KG/M2 | HEIGHT: 63 IN | WEIGHT: 254 LBS

## 2022-08-29 DIAGNOSIS — Z12.31 ENCOUNTER FOR SCREENING MAMMOGRAM FOR MALIGNANT NEOPLASM OF BREAST: ICD-10-CM

## 2022-08-29 DIAGNOSIS — R10.2 SUPRAPUBIC DISCOMFORT: ICD-10-CM

## 2022-08-29 PROCEDURE — 77067 SCR MAMMO BI INCL CAD: CPT | Mod: 26,,, | Performed by: RADIOLOGY

## 2022-08-29 PROCEDURE — 77067 MAMMO DIGITAL SCREENING BILAT WITH TOMO: ICD-10-PCS | Mod: 26,,, | Performed by: RADIOLOGY

## 2022-08-29 PROCEDURE — 77063 MAMMO DIGITAL SCREENING BILAT WITH TOMO: ICD-10-PCS | Mod: 26,,, | Performed by: RADIOLOGY

## 2022-08-29 PROCEDURE — 77063 BREAST TOMOSYNTHESIS BI: CPT | Mod: 26,,, | Performed by: RADIOLOGY

## 2022-08-29 PROCEDURE — 77067 SCR MAMMO BI INCL CAD: CPT | Mod: TC,PO

## 2022-08-29 PROCEDURE — 77063 BREAST TOMOSYNTHESIS BI: CPT | Mod: TC,PO

## 2022-09-01 ENCOUNTER — PATIENT MESSAGE (OUTPATIENT)
Dept: HEMATOLOGY/ONCOLOGY | Facility: CLINIC | Age: 67
End: 2022-09-01
Payer: COMMERCIAL

## 2022-09-07 LAB — NONINV COLON CA DNA+OCC BLD SCRN STL QL: NEGATIVE

## 2022-09-08 ENCOUNTER — TELEPHONE (OUTPATIENT)
Dept: FAMILY MEDICINE | Facility: CLINIC | Age: 67
End: 2022-09-08
Payer: COMMERCIAL

## 2022-09-08 DIAGNOSIS — R74.8 ELEVATED LIVER ENZYMES: Primary | ICD-10-CM

## 2022-09-08 NOTE — TELEPHONE ENCOUNTER
----- Message from Piter Hansen MD sent at 9/7/2022 12:41 PM CDT -----  Lab okay except for mild elevation liver enzyme.  Recommend recheck LFT with acute hepatitis panel in 1 month. My nurse will contact you to arrange.  Thanks,  Dr. Hansen

## 2022-10-07 ENCOUNTER — LAB VISIT (OUTPATIENT)
Dept: LAB | Facility: HOSPITAL | Age: 67
End: 2022-10-07
Payer: COMMERCIAL

## 2022-10-07 ENCOUNTER — CLINICAL SUPPORT (OUTPATIENT)
Dept: FAMILY MEDICINE | Facility: CLINIC | Age: 67
End: 2022-10-07
Payer: COMMERCIAL

## 2022-10-07 DIAGNOSIS — R74.8 ELEVATED LIVER ENZYMES: ICD-10-CM

## 2022-10-07 DIAGNOSIS — Z23 NEEDS FLU SHOT: Primary | ICD-10-CM

## 2022-10-07 LAB
ALBUMIN SERPL BCP-MCNC: 3.9 G/DL (ref 3.5–5.2)
ALP SERPL-CCNC: 98 U/L (ref 55–135)
ALT SERPL W/O P-5'-P-CCNC: 60 U/L (ref 10–44)
AST SERPL-CCNC: 47 U/L (ref 10–40)
BILIRUB DIRECT SERPL-MCNC: 0.2 MG/DL (ref 0.1–0.3)
BILIRUB SERPL-MCNC: 0.6 MG/DL (ref 0.1–1)
HAV IGM SERPL QL IA: NORMAL
HBV CORE IGM SERPL QL IA: NORMAL
HBV SURFACE AG SERPL QL IA: NORMAL
HCV AB SERPL QL IA: NORMAL
PROT SERPL-MCNC: 7.1 G/DL (ref 6–8.4)

## 2022-10-07 PROCEDURE — 99999 PR PBB SHADOW E&M-EST. PATIENT-LVL II: ICD-10-PCS | Mod: PBBFAC,,,

## 2022-10-07 PROCEDURE — 90471 IMMUNIZATION ADMIN: CPT | Mod: S$GLB,,, | Performed by: FAMILY MEDICINE

## 2022-10-07 PROCEDURE — 36415 COLL VENOUS BLD VENIPUNCTURE: CPT | Mod: PO | Performed by: FAMILY MEDICINE

## 2022-10-07 PROCEDURE — 90694 FLU VACCINE - QUADRIVALENT - ADJUVANTED: ICD-10-PCS | Mod: S$GLB,,, | Performed by: FAMILY MEDICINE

## 2022-10-07 PROCEDURE — 80076 HEPATIC FUNCTION PANEL: CPT | Performed by: FAMILY MEDICINE

## 2022-10-07 PROCEDURE — 99999 PR PBB SHADOW E&M-EST. PATIENT-LVL II: CPT | Mod: PBBFAC,,,

## 2022-10-07 PROCEDURE — 90694 VACC AIIV4 NO PRSRV 0.5ML IM: CPT | Mod: S$GLB,,, | Performed by: FAMILY MEDICINE

## 2022-10-07 PROCEDURE — 80074 ACUTE HEPATITIS PANEL: CPT | Performed by: FAMILY MEDICINE

## 2022-10-07 PROCEDURE — 90471 FLU VACCINE - QUADRIVALENT - ADJUVANTED: ICD-10-PCS | Mod: S$GLB,,, | Performed by: FAMILY MEDICINE

## 2022-10-20 ENCOUNTER — TELEPHONE (OUTPATIENT)
Dept: FAMILY MEDICINE | Facility: CLINIC | Age: 67
End: 2022-10-20
Payer: COMMERCIAL

## 2022-10-20 NOTE — TELEPHONE ENCOUNTER
Pt was advised that her Nurse appt was resched to 11/3/22 at 230 pm . I also mailed an appt confirmation .

## 2022-10-20 NOTE — TELEPHONE ENCOUNTER
----- Message from Precious Gu sent at 10/20/2022  1:19 PM CDT -----  Pt called in re: reschedule her appt for shingles injection she would like to schedule if possible for 11/03 after 2p @ the [place] location please call pt @ .[phone]      Thanks [facility]

## 2022-10-25 ENCOUNTER — TELEPHONE (OUTPATIENT)
Dept: FAMILY MEDICINE | Facility: CLINIC | Age: 67
End: 2022-10-25

## 2022-10-25 DIAGNOSIS — R74.8 ELEVATED LIVER ENZYMES: Primary | ICD-10-CM

## 2022-11-03 ENCOUNTER — CLINICAL SUPPORT (OUTPATIENT)
Dept: FAMILY MEDICINE | Facility: CLINIC | Age: 67
End: 2022-11-03
Payer: COMMERCIAL

## 2022-11-03 DIAGNOSIS — Z23 IMMUNIZATION DUE: Primary | ICD-10-CM

## 2022-11-03 PROCEDURE — 90471 IMMUNIZATION ADMIN: CPT | Mod: S$GLB,,, | Performed by: FAMILY MEDICINE

## 2022-11-03 PROCEDURE — 99999 PR PBB SHADOW E&M-EST. PATIENT-LVL I: CPT | Mod: PBBFAC,,,

## 2022-11-03 PROCEDURE — 90750 HZV VACC RECOMBINANT IM: CPT | Mod: S$GLB,,, | Performed by: FAMILY MEDICINE

## 2022-11-03 PROCEDURE — 90750 ZOSTER RECOMBINANT VACCINE: ICD-10-PCS | Mod: S$GLB,,, | Performed by: FAMILY MEDICINE

## 2022-11-03 PROCEDURE — 90471 ZOSTER RECOMBINANT VACCINE: ICD-10-PCS | Mod: S$GLB,,, | Performed by: FAMILY MEDICINE

## 2022-11-03 PROCEDURE — 99999 PR PBB SHADOW E&M-EST. PATIENT-LVL I: ICD-10-PCS | Mod: PBBFAC,,,

## 2023-04-10 ENCOUNTER — LAB VISIT (OUTPATIENT)
Dept: LAB | Facility: HOSPITAL | Age: 68
End: 2023-04-10
Payer: COMMERCIAL

## 2023-04-10 DIAGNOSIS — R74.8 ELEVATED LIVER ENZYMES: ICD-10-CM

## 2023-04-10 LAB
ALBUMIN SERPL BCP-MCNC: 3.7 G/DL (ref 3.5–5.2)
ALP SERPL-CCNC: 96 U/L (ref 55–135)
ALT SERPL W/O P-5'-P-CCNC: 38 U/L (ref 10–44)
AST SERPL-CCNC: 33 U/L (ref 10–40)
BILIRUB DIRECT SERPL-MCNC: 0.2 MG/DL (ref 0.1–0.3)
BILIRUB SERPL-MCNC: 0.4 MG/DL (ref 0.1–1)
FERRITIN SERPL-MCNC: 122 NG/ML (ref 20–300)
IRON SERPL-MCNC: 58 UG/DL (ref 30–160)
PROT SERPL-MCNC: 7.3 G/DL (ref 6–8.4)
SATURATED IRON: 16 % (ref 20–50)
TOTAL IRON BINDING CAPACITY: 373 UG/DL (ref 250–450)
TRANSFERRIN SERPL-MCNC: 252 MG/DL (ref 200–375)

## 2023-04-10 PROCEDURE — 82728 ASSAY OF FERRITIN: CPT | Performed by: FAMILY MEDICINE

## 2023-04-10 PROCEDURE — 80076 HEPATIC FUNCTION PANEL: CPT | Performed by: FAMILY MEDICINE

## 2023-04-10 PROCEDURE — 36415 COLL VENOUS BLD VENIPUNCTURE: CPT | Mod: PO | Performed by: FAMILY MEDICINE

## 2023-04-10 PROCEDURE — 84466 ASSAY OF TRANSFERRIN: CPT | Performed by: FAMILY MEDICINE

## 2023-05-31 RX ORDER — APIXABAN 5 MG/1
TABLET, FILM COATED ORAL
Qty: 60 TABLET | Refills: 5 | Status: SHIPPED | OUTPATIENT
Start: 2023-05-31 | End: 2023-11-30

## 2023-05-31 NOTE — TELEPHONE ENCOUNTER
Refill Routing Note   Medication(s) are not appropriate for processing by [facility] for the following reason(s):      Medication outside of protocol    [facility] action(s):  Route None identified          Appointments  past 12m or future 3m with PCP    Date Provider   Last Visit   8/25/2022 Piter Hansen MD   Next Visit   Visit date not found Piter Hansen MD   ED visits in past 90 days: 0        Note composed:1:42 PM 05/31/2023          
asleep
sleepy

## 2023-07-01 ENCOUNTER — TELEPHONE (OUTPATIENT)
Dept: FAMILY MEDICINE | Facility: CLINIC | Age: 68
End: 2023-07-01
Payer: COMMERCIAL

## 2023-07-01 DIAGNOSIS — Z12.31 VISIT FOR SCREENING MAMMOGRAM: Primary | ICD-10-CM

## 2023-07-01 NOTE — TELEPHONE ENCOUNTER
----- Message from Rosmery Herrera sent at 6/30/2023  4:39 PM CDT -----  Contact: self  Pt is asking for an return call in reference to having orders put in to schedule mammogram, please call back at .[phone] Thx CJ

## 2023-07-05 RX ORDER — CHOLESTYRAMINE 4 G/5.5G
POWDER, FOR SUSPENSION ORAL
Qty: 90 PACKET | Refills: 0 | Status: SHIPPED | OUTPATIENT
Start: 2023-07-05 | End: 2024-01-03

## 2023-07-05 NOTE — TELEPHONE ENCOUNTER
Provider Staff:  Action required for this patient     Please see care gap opportunities below in Care Due Message.    Thanks!  [facility]     Appointments      Date Provider   Last Visit   8/25/2022 Piter Hansen MD   Next Visit   7/6/2023 Piter Hansen MD     Refill Decision Note   Charlene Jeffery  is requesting a refill authorization.  Brief Assessment and Rationale for Refill:  Approve     Medication Therapy Plan:         Comments:     Note composed:2:34 PM 07/05/2023

## 2023-07-05 NOTE — TELEPHONE ENCOUNTER
Care Due:                  Date            Visit Type   Department     Provider  --------------------------------------------------------------------------------                                EP -                              PRIMARY      [facility]  Last Visit: 08-      CARE ([facility])   MEDICINE       Piter Hansen  Next Visit: None Scheduled  None         None Found                                                            Last  Test          Frequency    Reason                     Performed    Due Date  --------------------------------------------------------------------------------    Office Visit  12 months..  PREVALITE, venlafaxine...  08- 08-    Cr..........  12 months..  venlafaxine..............  08- 08-    Lipid Panel.  12 months..  PREVALITE................  08- 08-    [employer] Embedded Care Due Messages. Reference number: [id number].   7/05/2023 9:06:46 AM CDT

## 2023-07-06 ENCOUNTER — OFFICE VISIT (OUTPATIENT)
Dept: FAMILY MEDICINE | Facility: CLINIC | Age: 68
End: 2023-07-06
Payer: COMMERCIAL

## 2023-07-06 ENCOUNTER — LAB VISIT (OUTPATIENT)
Dept: LAB | Facility: HOSPITAL | Age: 68
End: 2023-07-06
Attending: FAMILY MEDICINE
Payer: COMMERCIAL

## 2023-07-06 VITALS
SYSTOLIC BLOOD PRESSURE: 128 MMHG | TEMPERATURE: 97 F | HEART RATE: 67 BPM | DIASTOLIC BLOOD PRESSURE: 88 MMHG | OXYGEN SATURATION: 95 % | BODY MASS INDEX: 46.05 KG/M2 | WEIGHT: 259.88 LBS | HEIGHT: 63 IN

## 2023-07-06 DIAGNOSIS — K58.0 IRRITABLE BOWEL SYNDROME WITH DIARRHEA: ICD-10-CM

## 2023-07-06 DIAGNOSIS — D68.59 THROMBOPHILIA: ICD-10-CM

## 2023-07-06 DIAGNOSIS — Z12.31 ENCOUNTER FOR SCREENING MAMMOGRAM FOR MALIGNANT NEOPLASM OF BREAST: ICD-10-CM

## 2023-07-06 DIAGNOSIS — Z79.01 ANTICOAGULATED: ICD-10-CM

## 2023-07-06 DIAGNOSIS — Z00.00 ROUTINE HISTORY AND PHYSICAL EXAMINATION OF ADULT: ICD-10-CM

## 2023-07-06 DIAGNOSIS — R76.0 ANTICARDIOLIPIN ANTIBODY POSITIVE: ICD-10-CM

## 2023-07-06 DIAGNOSIS — F33.40 RECURRENT MAJOR DEPRESSIVE DISORDER, IN REMISSION: ICD-10-CM

## 2023-07-06 DIAGNOSIS — Z71.1 CONCERN ABOUT FEMALE BREAST DISEASE WITHOUT DIAGNOSIS: Primary | ICD-10-CM

## 2023-07-06 DIAGNOSIS — E66.01 OBESITY, MORBID, BMI 40.0-49.9: ICD-10-CM

## 2023-07-06 DIAGNOSIS — Z86.718 HISTORY OF DEEP VEIN THROMBOSIS (DVT) OF LOWER EXTREMITY: ICD-10-CM

## 2023-07-06 PROBLEM — I82.401 ACUTE DEEP VEIN THROMBOSIS (DVT) OF RIGHT LOWER EXTREMITY: Status: RESOLVED | Noted: 2021-07-14 | Resolved: 2023-07-06

## 2023-07-06 LAB
ALBUMIN SERPL BCP-MCNC: 3.8 G/DL (ref 3.5–5.2)
ALP SERPL-CCNC: 90 U/L (ref 55–135)
ALT SERPL W/O P-5'-P-CCNC: 36 U/L (ref 10–44)
ANION GAP SERPL CALC-SCNC: 8 MMOL/L (ref 8–16)
AST SERPL-CCNC: 31 U/L (ref 10–40)
BASOPHILS # BLD AUTO: 0.03 K/UL (ref 0–0.2)
BASOPHILS NFR BLD: 0.4 % (ref 0–1.9)
BILIRUB SERPL-MCNC: 0.6 MG/DL (ref 0.1–1)
BUN SERPL-MCNC: 18 MG/DL (ref 8–23)
CALCIUM SERPL-MCNC: 9.5 MG/DL (ref 8.7–10.5)
CHLORIDE SERPL-SCNC: 106 MMOL/L (ref 95–110)
CHOLEST SERPL-MCNC: 176 MG/DL (ref 120–199)
CHOLEST/HDLC SERPL: 3 {RATIO} (ref 2–5)
CO2 SERPL-SCNC: 27 MMOL/L (ref 23–29)
CREAT SERPL-MCNC: 0.8 MG/DL (ref 0.5–1.4)
DIFFERENTIAL METHOD: ABNORMAL
EOSINOPHIL # BLD AUTO: 0.1 K/UL (ref 0–0.5)
EOSINOPHIL NFR BLD: 1 % (ref 0–8)
ERYTHROCYTE [DISTWIDTH] IN BLOOD BY AUTOMATED COUNT: 12.8 % (ref 11.5–14.5)
EST. GFR  (NO RACE VARIABLE): >60 ML/MIN/1.73 M^2
ESTIMATED AVG GLUCOSE: 97 MG/DL (ref 68–131)
GLUCOSE SERPL-MCNC: 98 MG/DL (ref 70–110)
HBA1C MFR BLD: 5 % (ref 4–5.6)
HCT VFR BLD AUTO: 42.8 % (ref 37–48.5)
HDLC SERPL-MCNC: 58 MG/DL (ref 40–75)
HDLC SERPL: 33 % (ref 20–50)
HGB BLD-MCNC: 13.5 G/DL (ref 12–16)
IMM GRANULOCYTES # BLD AUTO: 0.02 K/UL (ref 0–0.04)
IMM GRANULOCYTES NFR BLD AUTO: 0.3 % (ref 0–0.5)
LDLC SERPL CALC-MCNC: 101 MG/DL (ref 63–159)
LYMPHOCYTES # BLD AUTO: 1.4 K/UL (ref 1–4.8)
LYMPHOCYTES NFR BLD: 19.6 % (ref 18–48)
MCH RBC QN AUTO: 28.9 PG (ref 27–31)
MCHC RBC AUTO-ENTMCNC: 31.5 G/DL (ref 32–36)
MCV RBC AUTO: 92 FL (ref 82–98)
MONOCYTES # BLD AUTO: 0.4 K/UL (ref 0.3–1)
MONOCYTES NFR BLD: 5.8 % (ref 4–15)
NEUTROPHILS # BLD AUTO: 5 K/UL (ref 1.8–7.7)
NEUTROPHILS NFR BLD: 72.9 % (ref 38–73)
NONHDLC SERPL-MCNC: 118 MG/DL
NRBC BLD-RTO: 0 /100 WBC
PLATELET # BLD AUTO: 215 K/UL (ref 150–450)
PMV BLD AUTO: 12.8 FL (ref 9.2–12.9)
POTASSIUM SERPL-SCNC: 4.9 MMOL/L (ref 3.5–5.1)
PROT SERPL-MCNC: 7.5 G/DL (ref 6–8.4)
RBC # BLD AUTO: 4.67 M/UL (ref 4–5.4)
SODIUM SERPL-SCNC: 141 MMOL/L (ref 136–145)
TRIGL SERPL-MCNC: 85 MG/DL (ref 30–150)
WBC # BLD AUTO: 6.9 K/UL (ref 3.9–12.7)

## 2023-07-06 PROCEDURE — 99213 PR OFFICE/OUTPT VISIT, EST, LEVL III, 20-29 MIN: ICD-10-PCS | Mod: 25,S$GLB,, | Performed by: FAMILY MEDICINE

## 2023-07-06 PROCEDURE — 3288F PR FALLS RISK ASSESSMENT DOCUMENTED: ICD-10-PCS | Mod: CPTII,S$GLB,, | Performed by: FAMILY MEDICINE

## 2023-07-06 PROCEDURE — 3074F SYST BP LT 130 MM HG: CPT | Mod: CPTII,S$GLB,, | Performed by: FAMILY MEDICINE

## 2023-07-06 PROCEDURE — 99999 PR PBB SHADOW E&M-EST. PATIENT-LVL IV: CPT | Mod: PBBFAC,,, | Performed by: FAMILY MEDICINE

## 2023-07-06 PROCEDURE — 3288F FALL RISK ASSESSMENT DOCD: CPT | Mod: CPTII,S$GLB,, | Performed by: FAMILY MEDICINE

## 2023-07-06 PROCEDURE — 3079F DIAST BP 80-89 MM HG: CPT | Mod: CPTII,S$GLB,, | Performed by: FAMILY MEDICINE

## 2023-07-06 PROCEDURE — 1159F MED LIST DOCD IN RCRD: CPT | Mod: CPTII,S$GLB,, | Performed by: FAMILY MEDICINE

## 2023-07-06 PROCEDURE — 99999 PR PBB SHADOW E&M-EST. PATIENT-LVL IV: ICD-10-PCS | Mod: PBBFAC,,, | Performed by: FAMILY MEDICINE

## 2023-07-06 PROCEDURE — 99397 PR PREVENTIVE VISIT,EST,65 & OVER: ICD-10-PCS | Mod: S$GLB,,, | Performed by: FAMILY MEDICINE

## 2023-07-06 PROCEDURE — 3008F PR BODY MASS INDEX (BMI) DOCUMENTED: ICD-10-PCS | Mod: CPTII,S$GLB,, | Performed by: FAMILY MEDICINE

## 2023-07-06 PROCEDURE — 1101F PR PT FALLS ASSESS DOC 0-1 FALLS W/OUT INJ PAST YR: ICD-10-PCS | Mod: CPTII,S$GLB,, | Performed by: FAMILY MEDICINE

## 2023-07-06 PROCEDURE — 36415 COLL VENOUS BLD VENIPUNCTURE: CPT | Mod: PO | Performed by: FAMILY MEDICINE

## 2023-07-06 PROCEDURE — 3079F PR MOST RECENT DIASTOLIC BLOOD PRESSURE 80-89 MM HG: ICD-10-PCS | Mod: CPTII,S$GLB,, | Performed by: FAMILY MEDICINE

## 2023-07-06 PROCEDURE — 99213 OFFICE O/P EST LOW 20 MIN: CPT | Mod: 25,S$GLB,, | Performed by: FAMILY MEDICINE

## 2023-07-06 PROCEDURE — 83036 HEMOGLOBIN GLYCOSYLATED A1C: CPT | Performed by: FAMILY MEDICINE

## 2023-07-06 PROCEDURE — 80061 LIPID PANEL: CPT | Performed by: FAMILY MEDICINE

## 2023-07-06 PROCEDURE — 1101F PT FALLS ASSESS-DOCD LE1/YR: CPT | Mod: CPTII,S$GLB,, | Performed by: FAMILY MEDICINE

## 2023-07-06 PROCEDURE — 3008F BODY MASS INDEX DOCD: CPT | Mod: CPTII,S$GLB,, | Performed by: FAMILY MEDICINE

## 2023-07-06 PROCEDURE — 99397 PER PM REEVAL EST PAT 65+ YR: CPT | Mod: S$GLB,,, | Performed by: FAMILY MEDICINE

## 2023-07-06 PROCEDURE — 85025 COMPLETE CBC W/AUTO DIFF WBC: CPT | Performed by: FAMILY MEDICINE

## 2023-07-06 PROCEDURE — 3074F PR MOST RECENT SYSTOLIC BLOOD PRESSURE < 130 MM HG: ICD-10-PCS | Mod: CPTII,S$GLB,, | Performed by: FAMILY MEDICINE

## 2023-07-06 PROCEDURE — 80053 COMPREHEN METABOLIC PANEL: CPT | Performed by: FAMILY MEDICINE

## 2023-07-06 PROCEDURE — 1159F PR MEDICATION LIST DOCUMENTED IN MEDICAL RECORD: ICD-10-PCS | Mod: CPTII,S$GLB,, | Performed by: FAMILY MEDICINE

## 2023-07-06 NOTE — PROGRESS NOTES
Patient came in today concerned regarding possible breast mass.  She thought she palpated something on the right lateral breast about a week ago.  She had a nurse friend check for her and they thought they felt something on both sides she notices mild tenderness if she palpates deeply at the area.  Otherwise asymptomatic.  Last mammogram last August was negative.  No adenopathy noted.    She is due for her physical exam.  She is on lifelong anticoagulation per hematology recommendation due to prior unprovoked DVT with thrombophilia related to anti cardiolipin antibody positive.  She has not had any recurrent symptoms.  No bleeding.  She does have morbid obesity noted.  Depression stable current medication.  IBS uses Bentyl successfully as well as cholestyramine.    Charlene was seen today for breast mass.    Diagnoses and all orders for this visit:    Concern about female breast disease without diagnosis  -     Mammo Digital Diagnostic Bilat with Moose; Future    Encounter for screening mammogram for malignant neoplasm of breast  -     Mammo Digital Diagnostic Bilat with Moose; Future    Routine history and physical examination of adult  -     Mammo Digital Diagnostic Bilat with Moose; Future  -     CBC Auto Differential; Future  -     Hemoglobin A1C; Future  -     Comprehensive Metabolic Panel; Future  -     Lipid Panel; Future    Anticardiolipin antibody positive    Thrombophilia    History of deep vein thrombosis (DVT) of lower extremity    Obesity, morbid, BMI 40.0-49.9  -     Hemoglobin A1C; Future    Recurrent major depressive disorder, in remission    Irritable bowel syndrome with diarrhea  -     Comprehensive Metabolic Panel; Future    Anticoagulated  -     CBC Auto Differential; Future      Was not able to palpate any masses on breast exam today.  We will obtain diagnostic mammogram bilateral with further evaluation pending results.    Check laboratory above.  Continue current medications.  Anticipatory guidance:  Don't smoke.  Healthy diet and regular exercise recommended.  Colon screening up-to-date.  Recommend COVID booster.            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 Ovaries intact    ROTATOR CUFF REPAIR Right     toe surgeries      Tonsillectomy       Review of patient's allergies indicates:   Allergen Reactions    Venomil wasp venom [venom-wasp]      Swelling and difficulty breathing     Current Outpatient Medications on File Prior to Visit   Medication Sig Dispense Refill    dicyclomine (BENTYL) 10 MG capsule Take 1 capsule by mouth once daily 90 capsule 1    ELIQUIS 5 mg Tab Take 1 tablet by mouth twice daily 60 tablet 5    Lactobac no.41/Bifidobact no.7 (PROBIOTIC-10 ORAL) Take 1 capsule by mouth once daily.      PREVALITE 4 gram PwPk DISSOLVE & TAKE 1 PACKET BY MOUTH ONCE DAILY (MIX  IN  WATER) 90 packet 0    venlafaxine (EFFEXOR-XR) 75 MG 24 hr capsule Take 1 capsule by mouth once daily 90 capsule 3     No current facility-administered medications on file prior to visit.     Social History     Socioeconomic History    Marital status: Single   Tobacco Use    Smoking status: Never    Smokeless tobacco: Never   Substance and Sexual Activity    Alcohol use: Yes      "Comment: occasion    Drug use: No     Family History   Problem Relation Age of Onset    Diabetes Mother     Diabetes Father     Depression Father     Breast cancer Maternal Aunt     Breast cancer Maternal Aunt            ROS:  GENERAL: No fever, chills,  or significant weight changes.   CARDIOVASCULAR: Denies chest pain, PND, orthopnea or reduced exercise tolerance.  ABDOMEN: Appetite fine. Denies diarrhea, abdominal pain, hematemesis or blood in stool.  URINARY: No flank pain, dysuria or hematuria.    Vitals:    07/06/23 0849   BP: 128/88   Pulse: 67   Temp: 97.3 °F (36.3 °C)   TempSrc: Temporal   SpO2: 95%   Weight: 117.9 kg (259 lb 14.4 oz)   Height: 5' 3" (1.6 m)     Wt Readings from Last 3 Encounters:   07/06/23 117.9 kg (259 lb 14.4 oz)   08/29/22 115.2 kg (254 lb)   08/25/22 115.3 kg (254 lb 4.8 oz)       OBJECTIVE:   APPEARANCE: Well nourished, well developed, in no acute distress.    HEAD: Normocephalic.  Atraumatic.  No sinus tenderness.  EYES:   Right eye: Pupil reactive.  Conjunctiva clear.    Left eye: Pupil reactive.  Conjunctiva clear.  EOMI.    EARS: TM's intact. Light reflex normal. No retraction or perforation.    NOSE:  clear.  MOUTH & THROAT:  No pharyngeal erythema or exudate. No lesions.  NECK: Supple. No bruits.  No JVD.  No cervical lymphadenopathy.  No thyromegaly.    CHEST: Breath sounds clear bilaterally.  Normal respiratory effort  CARDIOVASCULAR: Normal rate.  Regular rhythm.  No murmurs.  No rub.  No gallops.  ABDOMEN: Bowel sounds normal.  Soft.  No tenderness.  No organomegaly.  PERIPHERAL VASCULAR: No cyanosis.  No clubbing.  No edema.  NEUROLOGIC: No focal findings.  MENTAL STATUS: Alert.  Oriented x 3.    Breast exam bilateral with no masses palpated.  No discharge.  No skin changes.  No adenopathy noted.    "

## 2023-07-12 ENCOUNTER — TELEPHONE (OUTPATIENT)
Dept: FAMILY MEDICINE | Facility: CLINIC | Age: 68
End: 2023-07-12
Payer: COMMERCIAL

## 2023-07-12 DIAGNOSIS — Z71.1 CONCERN ABOUT FEMALE BREAST DISEASE WITHOUT DIAGNOSIS: Primary | ICD-10-CM

## 2023-07-12 NOTE — TELEPHONE ENCOUNTER
----- Message from Ambreen Flynn sent at 7/12/2023  9:46 AM CDT -----  Regarding: Pt called to schedule her mammogram appt and would like a call back this morning  Appointment Access Request:    Pt called to schedule her mammogram appt and would like a call back this morning    Pt can be reached at [phone]

## 2023-07-12 NOTE — TELEPHONE ENCOUNTER
Patient informed to call radiology scheduling to schedule diagnostic mammogram as I don't have access, number given

## 2023-07-12 NOTE — TELEPHONE ENCOUNTER
----- Message from Jayce Griffiths sent at 7/12/2023 10:07 AM CDT -----  Good Morning,    This patient has a diagnostic mammogram ordered . Can y'all please put a breast US order in to go along with the diagnostic mammogram?    Thank you,  Jayce  Radiology Dept.     Addended by: KENNY MORRISON on: 9/24/2020 12:17 PM     Modules accepted: Orders

## 2023-07-18 ENCOUNTER — HOSPITAL ENCOUNTER (OUTPATIENT)
Dept: RADIOLOGY | Facility: HOSPITAL | Age: 68
Discharge: HOME | End: 2023-07-18
Attending: FAMILY MEDICINE
Payer: COMMERCIAL

## 2023-07-18 DIAGNOSIS — Z71.1 CONCERN ABOUT FEMALE BREAST DISEASE WITHOUT DIAGNOSIS: ICD-10-CM

## 2023-07-18 DIAGNOSIS — Z00.00 ROUTINE HISTORY AND PHYSICAL EXAMINATION OF ADULT: ICD-10-CM

## 2023-07-18 DIAGNOSIS — Z12.31 ENCOUNTER FOR SCREENING MAMMOGRAM FOR MALIGNANT NEOPLASM OF BREAST: ICD-10-CM

## 2023-07-18 PROCEDURE — 77062 MAMMO DIGITAL DIAGNOSTIC BILAT WITH TOMO: ICD-10-PCS | Mod: 26,,, | Performed by: RADIOLOGY

## 2023-07-18 PROCEDURE — 77066 DX MAMMO INCL CAD BI: CPT | Mod: 26,,, | Performed by: RADIOLOGY

## 2023-07-18 PROCEDURE — 76642 US BREAST BILATERAL LIMITED: ICD-10-PCS | Mod: 26,,, | Performed by: RADIOLOGY

## 2023-07-18 PROCEDURE — 77066 MAMMO DIGITAL DIAGNOSTIC BILAT WITH TOMO: ICD-10-PCS | Mod: 26,,, | Performed by: RADIOLOGY

## 2023-07-18 PROCEDURE — 76642 ULTRASOUND BREAST LIMITED: CPT | Mod: TC,50,PO

## 2023-07-18 PROCEDURE — 76642 ULTRASOUND BREAST LIMITED: CPT | Mod: 26,,, | Performed by: RADIOLOGY

## 2023-07-18 PROCEDURE — 77062 BREAST TOMOSYNTHESIS BI: CPT | Mod: 26,,, | Performed by: RADIOLOGY

## 2023-07-18 PROCEDURE — 77066 DX MAMMO INCL CAD BI: CPT | Mod: TC,PO

## 2023-08-28 RX ORDER — VENLAFAXINE HYDROCHLORIDE 75 MG/1
CAPSULE, EXTENDED RELEASE ORAL
Qty: 90 CAPSULE | Refills: 3 | Status: SHIPPED | OUTPATIENT
Start: 2023-08-28

## 2023-08-28 NOTE — TELEPHONE ENCOUNTER
No care due was identified.  [facility] Embedded Care Due Messages. Reference number: [id number].   8/28/2023 10:13:29 AM CDT

## 2023-08-28 NOTE — TELEPHONE ENCOUNTER
Refill Decision Note   Charlene Jose D  is requesting a refill authorization.  Brief Assessment and Rationale for Refill:  Approve     Medication Therapy Plan:         Comments:     No Care Gaps recommended.     Note composed:6:12 PM 08/28/2023

## 2023-09-26 RX ORDER — DICYCLOMINE HYDROCHLORIDE 10 MG/1
CAPSULE ORAL
Qty: 90 CAPSULE | Refills: 3 | Status: SHIPPED | OUTPATIENT
Start: 2023-09-26

## 2023-11-30 RX ORDER — APIXABAN 5 MG/1
TABLET, FILM COATED ORAL
Qty: 60 TABLET | Refills: 11 | Status: SHIPPED | OUTPATIENT
Start: 2023-11-30

## 2024-01-02 NOTE — TELEPHONE ENCOUNTER
No care due was identified.  [facility] Embedded Care Due Messages. Reference number: [id number].   1/02/2024 11:54:15 AM CST

## 2024-01-03 RX ORDER — CHOLESTYRAMINE 4 G/5.5G
POWDER, FOR SUSPENSION ORAL
Qty: 90 PACKET | Refills: 1 | Status: SHIPPED | OUTPATIENT
Start: 2024-01-03

## 2024-01-03 NOTE — TELEPHONE ENCOUNTER
Refill Decision Note   Charlene Jeffery  is requesting a refill authorization.  Brief Assessment and Rationale for Refill:  Approve     Medication Therapy Plan:         Comments:     Note composed:8:47 AM 01/03/2024

## 2024-03-18 ENCOUNTER — OFFICE VISIT (OUTPATIENT)
Dept: FAMILY MEDICINE | Facility: CLINIC | Age: 69
End: 2024-03-18
Payer: COMMERCIAL

## 2024-03-18 VITALS
OXYGEN SATURATION: 98 % | HEIGHT: 63 IN | WEIGHT: 256.88 LBS | TEMPERATURE: 98 F | BODY MASS INDEX: 45.52 KG/M2 | DIASTOLIC BLOOD PRESSURE: 68 MMHG | HEART RATE: 84 BPM | SYSTOLIC BLOOD PRESSURE: 122 MMHG

## 2024-03-18 DIAGNOSIS — R04.0 RECURRENT EPISTAXIS: ICD-10-CM

## 2024-03-18 DIAGNOSIS — Z79.01 ANTICOAGULATED: ICD-10-CM

## 2024-03-18 DIAGNOSIS — F33.40 RECURRENT MAJOR DEPRESSIVE DISORDER, IN REMISSION: ICD-10-CM

## 2024-03-18 DIAGNOSIS — J18.9 PNEUMONIA OF RIGHT LUNG DUE TO INFECTIOUS ORGANISM, UNSPECIFIED PART OF LUNG: Primary | ICD-10-CM

## 2024-03-18 DIAGNOSIS — E66.01 OBESITY, MORBID, BMI 40.0-49.9: ICD-10-CM

## 2024-03-18 DIAGNOSIS — D68.59 THROMBOPHILIA: ICD-10-CM

## 2024-03-18 PROCEDURE — 3074F SYST BP LT 130 MM HG: CPT | Mod: CPTII,S$GLB,, | Performed by: FAMILY MEDICINE

## 2024-03-18 PROCEDURE — 99999 PR PBB SHADOW E&M-EST. PATIENT-LVL V: CPT | Mod: PBBFAC,,, | Performed by: FAMILY MEDICINE

## 2024-03-18 PROCEDURE — 3008F BODY MASS INDEX DOCD: CPT | Mod: CPTII,S$GLB,, | Performed by: FAMILY MEDICINE

## 2024-03-18 PROCEDURE — 1159F MED LIST DOCD IN RCRD: CPT | Mod: CPTII,S$GLB,, | Performed by: FAMILY MEDICINE

## 2024-03-18 PROCEDURE — 3288F FALL RISK ASSESSMENT DOCD: CPT | Mod: CPTII,S$GLB,, | Performed by: FAMILY MEDICINE

## 2024-03-18 PROCEDURE — 99214 OFFICE O/P EST MOD 30 MIN: CPT | Mod: S$GLB,,, | Performed by: FAMILY MEDICINE

## 2024-03-18 PROCEDURE — 1101F PT FALLS ASSESS-DOCD LE1/YR: CPT | Mod: CPTII,S$GLB,, | Performed by: FAMILY MEDICINE

## 2024-03-18 PROCEDURE — 3078F DIAST BP <80 MM HG: CPT | Mod: CPTII,S$GLB,, | Performed by: FAMILY MEDICINE

## 2024-03-18 RX ORDER — ALBUTEROL SULFATE 90 UG/1
2 AEROSOL, METERED RESPIRATORY (INHALATION)
Qty: 18 G | Refills: 0 | Status: SHIPPED | OUTPATIENT
Start: 2024-03-18

## 2024-03-18 RX ORDER — PREDNISONE 10 MG/1
10 TABLET ORAL 2 TIMES DAILY
COMMUNITY
Start: 2024-03-09

## 2024-03-18 RX ORDER — ONDANSETRON 4 MG/1
4 TABLET, ORALLY DISINTEGRATING ORAL EVERY 8 HOURS PRN
COMMUNITY

## 2024-03-18 RX ORDER — PHENIRAMINE MALEATE, PHENYLEPHRINE HCL 17.5; 1 MG/1; MG/1
1 TABLET ORAL
COMMUNITY
Start: 2023-09-30

## 2024-03-18 RX ORDER — PROMETHAZINE HYDROCHLORIDE AND DEXTROMETHORPHAN HYDROBROMIDE 6.25; 15 MG/5ML; MG/5ML
5 SYRUP ORAL EVERY 6 HOURS PRN
COMMUNITY
Start: 2024-03-09

## 2024-03-18 RX ORDER — ALBUTEROL SULFATE 90 UG/1
2 AEROSOL, METERED RESPIRATORY (INHALATION)
COMMUNITY
Start: 2024-03-09 | End: 2024-03-18 | Stop reason: SDUPTHER

## 2024-03-18 NOTE — PROGRESS NOTES
Patient presents follow-up urgent care and ER visit for right middle to upper lobe pneumonia.  Fever has resolved.  States did get some diarrhea with the antibiotics overall is feeling better however.  She is still using albuterol.  Cough has improved.  She does note that she has had occasional epistaxis from the right nostril going back several months.  She is on anticoagulation due thrombophilia prior DVT.  She denies any persistent bleeding.  Depression stable Effexor.  Morbid obesity stable.    Charlene was seen today for follow-up.    Diagnoses and all orders for this visit:    Pneumonia of right lung due to infectious organism, unspecified part of lung  -     X-Ray Chest PA And Lateral; Future  -     X-Ray Chest PA And Lateral    Recurrent epistaxis  -     Ambulatory referral/consult to ENT; Future    Anticoagulated  -     Ambulatory referral/consult to ENT; Future    Recurrent major depressive disorder, in remission    Obesity, morbid, BMI 40.0-49.9    Thrombophilia    Other orders  -     albuterol (PROVENTIL/VENTOLIN HFA) 90 mcg/actuation inhaler; Inhale 2 puffs into the lungs every 4 to 6 hours as needed for Wheezing or Shortness of Breath.    She is already completed antibiotics.  She can continue the albuterol for now.  Plan on rechecking a chest x-ray at [place] in 6 weeks.              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 Ovaries intact    ROTATOR CUFF REPAIR Right     toe surgeries      Tonsillectomy       Review of patient's allergies indicates:   Allergen Reactions    Venomil wasp venom [venom-wasp]      Swelling and difficulty breathing     Current Outpatient Medications on File Prior to Visit   Medication Sig Dispense Refill    ALAHIST D 10-17.5 mg Tab Take 1 tablet by mouth.      apixaban (ELIQUIS) 5 mg Tab Take 1 tablet by mouth twice daily 60 tablet 11    dicyclomine (BENTYL) 10 MG capsule Take 1 capsule by mouth once daily 90 capsule 3    Lactobac no.41/Bifidobact no.7 (PROBIOTIC-10 ORAL) Take 1 capsule by mouth once daily.      ondansetron (ZOFRAN-ODT) 4 MG TbDL Take 4 mg by mouth every 8 (eight) hours as needed.      predniSONE (DELTASONE) 10 MG tablet Take 10 mg by mouth 2 (two) times daily.      PREVALITE 4 gram PwPk DISSOLVE 1 PACKET IN WATER & TAKE BY MOUTH ONCE DAILY 90 packet 1    promethazine-dextromethorphan (PROMETHAZINE-DM) 6.25-15 mg/5 mL Syrp Take 5 mLs by mouth every 6 (six) hours as needed.      venlafaxine (EFFEXOR-XR) 75 MG 24 hr capsule Take 1 capsule by mouth once daily 90 capsule 3    [DISCONTINUED] albuterol (PROVENTIL/VENTOLIN HFA) 90 mcg/actuation inhaler 2 puffs every 4 to 6 hours as needed.       No current facility-administered medications on file prior to visit.     Social History     Socioeconomic History    Marital status: Single   Tobacco Use    Smoking status: Never    Smokeless tobacco: Never   Substance and Sexual Activity    Alcohol use: Yes     Comment: occasion    Drug use: No     Family History   Problem Relation Age of Onset    Diabetes Mother     Diabetes Father     Depression Father     Breast cancer Maternal Aunt     Breast cancer Maternal Aunt            ROS:  GENERAL: No fever, chills,  or significant weight  "changes.   CARDIOVASCULAR: Denies chest pain, PND, orthopnea or reduced exercise tolerance.  ABDOMEN: Appetite fine. Denies diarrhea, abdominal pain, hematemesis or blood in stool.  URINARY: No flank pain, dysuria or hematuria.    Vitals:    03/18/24 1054   BP: 122/68   Pulse: 84   Temp: 97.7 °F (36.5 °C)   TempSrc: Temporal   SpO2: 98%   Weight: 116.5 kg (256 lb 14.4 oz)   Height: 5' 3" (1.6 m)       Wt Readings from Last 3 Encounters:   03/18/24 116.5 kg (256 lb 14.4 oz)   07/06/23 117.9 kg (259 lb 14.4 oz)   08/29/22 115.2 kg (254 lb)       APPEARANCE: Well nourished, well developed, in no acute distress.    HEAD: Normocephalic.  Atraumatic.  Sinuses nontender  Ears:  Tympanic membranes intact.  No effusion or erythema.  Nose clear  Throat no erythema or exudate  EYES:   Right eye: Pupil reactive.  Conjunctiva clear.    Left eye: Pupil reactive.  Conjunctiva clear.    NECK: Supple. No bruits.  No JVD.  No cervical lymphadenopathy.  No thyromegaly.    CHEST: Breath sounds clear bilaterally.  Normal respiratory effort  CARDIOVASCULAR: Normal rate.  Regular rhythm.  No murmurs.  No rub.  No gallops.   No edema.  MENTAL STATUS: Alert.  Oriented x 3.  "

## 2024-04-22 ENCOUNTER — TELEPHONE (OUTPATIENT)
Dept: FAMILY MEDICINE | Facility: CLINIC | Age: 69
End: 2024-04-22
Payer: COMMERCIAL

## 2024-04-22 NOTE — TELEPHONE ENCOUNTER
----- Message from Cam Morse sent at 4/22/2024 12:47 PM CDT -----  Contact: [phone]  Patient is calling for xray results. Please call patient at [phone]. Thanks KB

## 2024-08-13 ENCOUNTER — TELEPHONE (OUTPATIENT)
Dept: FAMILY MEDICINE | Facility: CLINIC | Age: 69
End: 2024-08-13
Payer: COMMERCIAL

## 2024-08-13 DIAGNOSIS — Z12.31 ENCOUNTER FOR SCREENING MAMMOGRAM FOR BREAST CANCER: Primary | ICD-10-CM

## 2024-08-13 NOTE — TELEPHONE ENCOUNTER
----- Message from Hieu Serrano sent at 8/13/2024  3:11 PM CDT -----  Regarding: Mammogram Order  Patient is ready to schedule her next mammogram but doesn't have an order in the system for one.    Patient would like to be called at [phone] to schedule the mammogram once the order is put in.

## 2024-08-13 NOTE — TELEPHONE ENCOUNTER
----- Message from Hieu Serrano sent at 8/13/2024  3:11 PM CDT -----  Regarding: Mammogram Order  Patient is ready to schedule her next mammogram but doesn't have an order in the system for one.    Patient would like to be called at [phone] to schedule the mammogram once the order is put in.

## 2024-08-15 RX ORDER — VENLAFAXINE HYDROCHLORIDE 75 MG/1
CAPSULE, EXTENDED RELEASE ORAL
Qty: 90 CAPSULE | Refills: 2 | Status: SHIPPED | OUTPATIENT
Start: 2024-08-15

## 2024-08-15 NOTE — TELEPHONE ENCOUNTER
Refill Routing Note   Medication(s) are not appropriate for processing by [facility] for the following reason(s):        Required labs abnormal    [facility] action(s):  Defer   Requires labs : Yes           Pharmacist review requested: Yes     Appointments  past 12m or future 3m with PCP    Date Provider   Last Visit   3/18/2024 Piter Hansen MD   Next Visit   Visit date not found Piter Hansen MD   ED visits in past 90 days: 0        Note composed:11:35 AM 08/15/2024

## 2024-08-15 NOTE — TELEPHONE ENCOUNTER
Refill Routing Note   Medication(s) are not appropriate for processing by [facility] for the following reason(s):        Required labs outdated    [facility] action(s):  Defer     Requires labs : Yes           Pharmacist review requested: Yes     Appointments  past 12m or future 3m with PCP    Date Provider   Last Visit   3/18/2024 Piter Hansen MD   Next Visit   Visit date not found Piter Hansen MD   ED visits in past 90 days: 0        Note composed:12:50 PM 08/15/2024

## 2024-08-15 NOTE — TELEPHONE ENCOUNTER
Care Due:                  Date            Visit Type   Department     Provider  --------------------------------------------------------------------------------                                EP -                              PRIMARY      [facility] FAMILY  Last Visit: 03-      CARE ([facility])   MEDICINE       Piter Hansen  Next Visit: None Scheduled  None         None Found                                                            Last  Test          Frequency    Reason                     Performed    Due Date  --------------------------------------------------------------------------------    CMP.........  12 months..  PREVALITE, venlafaxine...  07- 06-    Lipid Panel.  12 months..  PREVALITE................  07- 06-    [employer] Embedded Care Due Messages. Reference number: [id number].   8/15/2024 10:26:58 AM CDT

## 2024-08-22 ENCOUNTER — HOSPITAL ENCOUNTER (OUTPATIENT)
Dept: RADIOLOGY | Facility: HOSPITAL | Age: 69
Discharge: HOME | End: 2024-08-22
Attending: FAMILY MEDICINE
Payer: COMMERCIAL

## 2024-08-22 DIAGNOSIS — Z12.31 ENCOUNTER FOR SCREENING MAMMOGRAM FOR BREAST CANCER: ICD-10-CM

## 2024-08-22 PROCEDURE — 77067 SCR MAMMO BI INCL CAD: CPT | Mod: TC,PO

## 2024-08-22 PROCEDURE — 77067 SCR MAMMO BI INCL CAD: CPT | Mod: 26,,, | Performed by: RADIOLOGY

## 2024-08-22 PROCEDURE — 77063 BREAST TOMOSYNTHESIS BI: CPT | Mod: 26,,, | Performed by: RADIOLOGY

## 2024-11-13 RX ORDER — CHOLESTYRAMINE LIGHT 4 G/5.7G
POWDER, FOR SUSPENSION ORAL
Qty: 90 PACKET | Refills: 3 | Status: SHIPPED | OUTPATIENT
Start: 2024-11-13

## 2024-11-13 NOTE — TELEPHONE ENCOUNTER
Refill Routing Note   Medication(s) are not appropriate for processing by [facility] for the following reason(s):        Required labs outdated  No active prescription written by provider    [facility] action(s):  Defer   Requires labs : Yes             Appointments  past 12m or future 3m with PCP    Date Provider   Last Visit   3/18/2024 Piter Hansen MD   Next Visit   Visit date not found Piter Hansen MD   ED visits in past 90 days: 0        Note composed:2:44 PM 11/13/2024

## 2024-11-13 NOTE — TELEPHONE ENCOUNTER
Care Due:                  Date            Visit Type   Department     Provider  --------------------------------------------------------------------------------                                EP -                              PRIMARY      [facility] FAMILY  Last Visit: 03-      CARE ([facility])   MEDICINE       Piter Hansen  Next Visit: None Scheduled  None         None Found                                                            Last  Test          Frequency    Reason                     Performed    Due Date  --------------------------------------------------------------------------------    CMP.........  12 months..  PREVALITE, venlafaxine...  07- 06-    Lipid Panel.  12 months..  PREVALITE................  07- 06-    [employer] Embedded Care Due Messages. Reference number: [id number].   11/13/2024 10:09:01 AM CST

## 2024-12-10 RX ORDER — APIXABAN 5 MG/1
TABLET, FILM COATED ORAL
Qty: 60 TABLET | Refills: 3 | Status: SHIPPED | OUTPATIENT
Start: 2024-12-10

## 2024-12-10 NOTE — TELEPHONE ENCOUNTER
----- Message from Tran sent at 12/10/2024 10:30 AM CST -----  Contact: Charlene  Type:  RX Refill Request    Who Called: Charlene  Refill or New Rx:Refill  RX Name and Strength:apixaban (ELIQUIS) 5 mg Tab  How is the patient currently taking it? (ex. 1XDay): 1x day  Is this a 30 day or 90 day RX: 60 day  Preferred Pharmacy with phone number:  On license of [facility] [address] [place], [place] - [address] [facility]  [address] [address]  Phone: [phone] Fax: [phone]  Local or Mail Order:Local  Ordering Provider:HAYLEE Ling   Would the patient rather a call back or a response via [facility]? Call back  Best Call Back Number:[phone]  Additional Information: Patient need a new Rx called in, the medication is out of refills. She also request to have the generic called in due to cost of the medication.

## 2025-02-06 RX ORDER — DICYCLOMINE HYDROCHLORIDE 10 MG/1
CAPSULE ORAL
Qty: 90 CAPSULE | Refills: 0 | Status: SHIPPED | OUTPATIENT
Start: 2025-02-06

## 2025-02-06 NOTE — TELEPHONE ENCOUNTER
Refill Routing Note   Medication(s) are not appropriate for processing by [facility] for the following reason(s):        Outside of protocol    [facility] action(s):  Route   Requires appointment : Yes     Requires labs : Yes             Appointments  past 12m or future 3m with PCP    Date Provider   Last Visit   3/18/2024 Piter Hansen MD   Next Visit   Visit date not found Piter Hansen MD   ED visits in past 90 days: 0        Note composed:12:47 PM 02/06/2025

## 2025-02-06 NOTE — TELEPHONE ENCOUNTER
Care Due:                  Date            Visit Type   Department     Provider  --------------------------------------------------------------------------------                                EP -                              PRIMARY      [facility] FAMILY  Last Visit: 03-      CARE ([facility])   MEDICINE       Piter Hansen  Next Visit: None Scheduled  None         None Found                                                            Last  Test          Frequency    Reason                     Performed    Due Date  --------------------------------------------------------------------------------    Office Visit  12 months..  dicyclomine..............  03- 03-    CMP.........  12 months..  CHOLESTYRAMINE,            07- 06-                             venlafaxine..............    Lipid Panel.  12 months..  CHOLESTYRAMINE...........  07- 06-    [employer] Embedded Care Due Messages. Reference number: [id number].   2/06/2025 11:54:20 AM CST

## 2025-04-26 NOTE — TELEPHONE ENCOUNTER
Care Due:                  Date            Visit Type   Department     Provider  --------------------------------------------------------------------------------                                EP -                              PRIMARY      [facility] FAMILY  Last Visit: 03-      CARE ([facility])   MEDICINE       Piter Hansen  Next Visit: None Scheduled  None         None Found                                                            Last  Test          Frequency    Reason                     Performed    Due Date  --------------------------------------------------------------------------------    Office Visit  12 months..  CHOLESTYRAMINE,            03- 03-                             albuterol, apixaban,                             dicyclomine, venlafaxine.    CBC.........  12 months..  apixaban.................  07- 06-    CMP.........  12 months..  CHOLESTYRAMINE, apixaban,   07- 06-                             venlafaxine..............    Lipid Panel.  12 months..  CHOLESTYRAMINE...........  07- 06-    [facility] Embedded Care Due Messages. Reference number: [id number].   4/26/2025 9:24:10 AM CDT

## 2025-04-26 NOTE — TELEPHONE ENCOUNTER
Refill Routing Note   Medication(s) are not appropriate for processing by [facility] for the following reason(s):        Outside of protocol  Required labs outdated  Required vitals outdated    [facility] action(s):  Defer  Route   Requires labs : Yes             Appointments  past 12m or future 3m with PCP    Date Provider   Last Visit   3/18/2024 Piter Hansen MD   Next Visit   Visit date not found Piter Hansen MD   ED visits in past 90 days: 0        Note composed:5:02 PM 04/26/2025

## 2025-04-28 RX ORDER — VENLAFAXINE HYDROCHLORIDE 75 MG/1
75 CAPSULE, EXTENDED RELEASE ORAL
Qty: 90 CAPSULE | Refills: 0 | Status: SHIPPED | OUTPATIENT
Start: 2025-04-28

## 2025-04-28 RX ORDER — DICYCLOMINE HYDROCHLORIDE 10 MG/1
10 CAPSULE ORAL
Qty: 90 CAPSULE | Refills: 0 | Status: SHIPPED | OUTPATIENT
Start: 2025-04-28

## 2025-04-30 ENCOUNTER — RESULTS FOLLOW-UP (OUTPATIENT)
Dept: FAMILY MEDICINE | Facility: CLINIC | Age: 70
End: 2025-04-30

## 2025-04-30 ENCOUNTER — OFFICE VISIT (OUTPATIENT)
Dept: FAMILY MEDICINE | Facility: CLINIC | Age: 70
End: 2025-04-30
Payer: COMMERCIAL

## 2025-04-30 ENCOUNTER — HOSPITAL ENCOUNTER (OUTPATIENT)
Dept: RADIOLOGY | Facility: HOSPITAL | Age: 70
Discharge: HOME | End: 2025-04-30
Attending: FAMILY MEDICINE
Payer: COMMERCIAL

## 2025-04-30 VITALS
TEMPERATURE: 98 F | SYSTOLIC BLOOD PRESSURE: 120 MMHG | WEIGHT: 255.38 LBS | HEART RATE: 73 BPM | HEIGHT: 63 IN | BODY MASS INDEX: 45.25 KG/M2 | DIASTOLIC BLOOD PRESSURE: 78 MMHG

## 2025-04-30 DIAGNOSIS — E66.01 OBESITY, MORBID, BMI 40.0-49.9: ICD-10-CM

## 2025-04-30 DIAGNOSIS — Z00.00 ROUTINE HISTORY AND PHYSICAL EXAMINATION OF ADULT: Primary | ICD-10-CM

## 2025-04-30 DIAGNOSIS — Z12.31 ENCOUNTER FOR SCREENING MAMMOGRAM FOR MALIGNANT NEOPLASM OF BREAST: ICD-10-CM

## 2025-04-30 DIAGNOSIS — M17.11 PRIMARY OSTEOARTHRITIS OF RIGHT KNEE: ICD-10-CM

## 2025-04-30 DIAGNOSIS — F33.40 RECURRENT MAJOR DEPRESSIVE DISORDER, IN REMISSION: ICD-10-CM

## 2025-04-30 DIAGNOSIS — Z79.01 ANTICOAGULANT LONG-TERM USE: ICD-10-CM

## 2025-04-30 DIAGNOSIS — Z86.718 HISTORY OF DEEP VEIN THROMBOSIS (DVT) OF LOWER EXTREMITY: ICD-10-CM

## 2025-04-30 DIAGNOSIS — Z91.81 HISTORY OF FALL: ICD-10-CM

## 2025-04-30 DIAGNOSIS — D68.59 THROMBOPHILIA: ICD-10-CM

## 2025-04-30 DIAGNOSIS — K58.0 IRRITABLE BOWEL SYNDROME WITH DIARRHEA: ICD-10-CM

## 2025-04-30 PROCEDURE — 1101F PT FALLS ASSESS-DOCD LE1/YR: CPT | Mod: CPTII,S$GLB,, | Performed by: FAMILY MEDICINE

## 2025-04-30 PROCEDURE — 1159F MED LIST DOCD IN RCRD: CPT | Mod: CPTII,S$GLB,, | Performed by: FAMILY MEDICINE

## 2025-04-30 PROCEDURE — 99397 PER PM REEVAL EST PAT 65+ YR: CPT | Mod: S$GLB,,, | Performed by: FAMILY MEDICINE

## 2025-04-30 PROCEDURE — 99999 PR PBB SHADOW E&M-EST. PATIENT-LVL V: CPT | Mod: PBBFAC,,, | Performed by: FAMILY MEDICINE

## 2025-04-30 PROCEDURE — 3078F DIAST BP <80 MM HG: CPT | Mod: CPTII,S$GLB,, | Performed by: FAMILY MEDICINE

## 2025-04-30 PROCEDURE — 1126F AMNT PAIN NOTED NONE PRSNT: CPT | Mod: CPTII,S$GLB,, | Performed by: FAMILY MEDICINE

## 2025-04-30 PROCEDURE — 3008F BODY MASS INDEX DOCD: CPT | Mod: CPTII,S$GLB,, | Performed by: FAMILY MEDICINE

## 2025-04-30 PROCEDURE — 3288F FALL RISK ASSESSMENT DOCD: CPT | Mod: CPTII,S$GLB,, | Performed by: FAMILY MEDICINE

## 2025-04-30 PROCEDURE — 73562 X-RAY EXAM OF KNEE 3: CPT | Mod: TC,PO,RT

## 2025-04-30 PROCEDURE — 3074F SYST BP LT 130 MM HG: CPT | Mod: CPTII,S$GLB,, | Performed by: FAMILY MEDICINE

## 2025-04-30 RX ORDER — ONDANSETRON 4 MG/1
4 TABLET, ORALLY DISINTEGRATING ORAL EVERY 8 HOURS PRN
Qty: 20 TABLET | Refills: 0 | Status: SHIPPED | OUTPATIENT
Start: 2025-04-30

## 2025-04-30 NOTE — PATIENT INSTRUCTIONS
Health Maintenance Due   Topic Date Due    RSV Vaccine (Age 60+ and Pregnant patients) (1 - Risk 60-74 years 1-dose series) Never done    COVID-19 Vaccine (3 - 2024-25 season) 09/01/2024

## 2025-04-30 NOTE — PROGRESS NOTES
Patient presents physical exam.  She is on chronic anticoagulation due to previous DVT with thrombophilia.  Depression doing well current medication.  Obesity stable.  IBS stable current treatment.    Ms. Jeffery reports right knee pain following a fall approximately 1 month ago. The incident occurred when she was rushing to the bathroom during a basketball game, slipped on a rug, and fell, impacting her knee on the floor, shoulders on the bathtub, and buttocks through the sheetrock wall. Since the fall, her right knee has caused significant discomfort, particularly at night when changing positions. The pain alternates between the posterior knee and patella. She notes day-to-day variability in pain intensity, with today being relatively manageable. She has been taking extra-strength Tylenol for pain relief with positive effect.    Ms. Jeffery has a history of arthritis in her right knee,. She has previously received injections f from Orthopedics      Ms. Jeffery discusses recent family stressors, including the death of her aunt Alison 2 months ago at nearly 100 years old, and the declining health of her aunt's sister, who has worsening dementia and is now residing in a care facility.      Charlene was seen today for annual exam.    Diagnoses and all orders for this visit:    Routine history and physical examination of adult  -     CBC Auto Differential; Future  -     Comprehensive Metabolic Panel; Future  -     Lipid Panel; Future    Thrombophilia    Recurrent major depressive disorder, in remission    Obesity, morbid, BMI 40.0-49.9    Irritable bowel syndrome with diarrhea    History of deep vein thrombosis (DVT) of lower extremity    Anticoagulant long-term use  -     CBC Auto Differential; Future    Encounter for screening mammogram for malignant neoplasm of breast  -     Mammo Digital Screening Bilat w/ [place] (XPD); Future    Primary osteoarthritis of right knee  -     Ambulatory referral/consult to  Orthopedics; Future  -     X-ray Knee Ortho Right (XPD); Future    History of fall  -     X-ray Knee Ortho Right (XPD); Future    Other orders  -     ondansetron (ZOFRAN-ODT) 4 MG TbDL; Take 1 tablet (4 mg total) by mouth every 8 (eight) hours as needed (nausea).      Continue current medications.  Arrange appointment with Orthopedics.  Reviewed recommended immunizations        This note was generated with the assistance of ambient listening technology. Verbal consent was obtained by the patient and accompanying visitor(s) for the recording of patient appointment to facilitate this note. I attest to having reviewed and edited the generated note for accuracy, though some syntax or spelling errors may persist. Please contact the author of this note for any clarification.        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 Ovaries intact    ROTATOR CUFF REPAIR Right     toe surgeries      Tonsillectomy       Review of patient's allergies indicates:   Allergen Reactions    Venomil wasp venom [venom-wasp]      Swelling and difficulty breathing     Medications Ordered Prior to Encounter[1]  Social History[2]  Family History   Problem Relation Name Age of Onset    Diabetes Mother       "Diabetes Father      Depression Father      Breast cancer Maternal Aunt      Breast cancer Maternal Aunt           Vitals:    04/30/25 0845 04/30/25 0913   BP: (!) 148/79 120/78   Pulse: 73    Temp: 97.7 °F (36.5 °C)    TempSrc: Temporal    Weight: 115.8 kg (255 lb 6.4 oz)    Height: 5' 3" (1.6 m)      Wt Readings from Last 3 Encounters:   04/30/25 115.8 kg (255 lb 6.4 oz)   03/18/24 116.5 kg (256 lb 14.4 oz)   07/06/23 117.9 kg (259 lb 14.4 oz)       OBJECTIVE:   APPEARANCE: Well nourished, well developed, in no acute distress.    HEAD: Normocephalic.  Atraumatic.  No sinus tenderness.  EYES:   Right eye: Pupil reactive.  Conjunctiva clear.    Left eye: Pupil reactive.  Conjunctiva clear.  EOMI.    EARS: TM's intact. Light reflex normal. No retraction or perforation.    NOSE:  clear.  MOUTH & THROAT:  No pharyngeal erythema or exudate. No lesions.  NECK: Supple. No bruits.  No JVD.  No cervical lymphadenopathy.  No thyromegaly.    CHEST: Breath sounds clear bilaterally.  Normal respiratory effort  CARDIOVASCULAR: Normal rate.  Regular rhythm.  No murmurs.  No rub.  No gallops.  ABDOMEN: Bowel sounds normal.  Soft.  No tenderness.  No organomegaly.  PERIPHERAL VASCULAR: No cyanosis.  No clubbing.  No edema.  NEUROLOGIC: No focal findings.  MENTAL STATUS: Alert.  Oriented x 3.               [1]   Current Outpatient Medications on File Prior to Visit   Medication Sig Dispense Refill    ALAHIST D 10-17.5 mg Tab Take 1 tablet by mouth.      apixaban (ELIQUIS) 5 mg Tab Take 1 tablet by mouth twice daily 60 tablet 3    CHOLESTYRAMINE LIGHT 4 gram packet DISSOLVE 1 PACKET IN WATER & TAKE BY MOUTH ONCE DAILY 90 packet 3    dicyclomine (BENTYL) 10 MG capsule Take 1 capsule by mouth once daily 90 capsule 0    Lactobac no.41/Bifidobact no.7 (PROBIOTIC-10 ORAL) Take 1 capsule by mouth once daily.      venlafaxine (EFFEXOR-XR) 75 MG 24 hr capsule Take 1 capsule by mouth once daily 90 capsule 0    [DISCONTINUED] ondansetron " (ZOFRAN-ODT) 4 MG TbDL Take 4 mg by mouth every 8 (eight) hours as needed.      [DISCONTINUED] albuterol (PROVENTIL/VENTOLIN HFA) 90 mcg/actuation inhaler Inhale 2 puffs into the lungs every 4 to 6 hours as needed for Wheezing or Shortness of Breath. 18 g 0    [DISCONTINUED] predniSONE (DELTASONE) 10 MG tablet Take 10 mg by mouth 2 (two) times daily.      [DISCONTINUED] promethazine-dextromethorphan (PROMETHAZINE-DM) 6.25-15 mg/5 mL Syrp Take 5 mLs by mouth every 6 (six) hours as needed.       No current facility-administered medications on file prior to visit.   [2]   Social History  Socioeconomic History    Marital status: Single   Tobacco Use    Smoking status: Never    Smokeless tobacco: Never   Substance and Sexual Activity    Alcohol use: Yes     Comment: occasion    Drug use: No

## 2025-05-14 ENCOUNTER — OFFICE VISIT (OUTPATIENT)
Dept: ORTHOPEDICS | Facility: CLINIC | Age: 70
End: 2025-05-14
Payer: COMMERCIAL

## 2025-05-14 DIAGNOSIS — M17.11 PRIMARY OSTEOARTHRITIS OF RIGHT KNEE: ICD-10-CM

## 2025-05-14 PROCEDURE — 1125F AMNT PAIN NOTED PAIN PRSNT: CPT | Mod: CPTII,S$GLB,, | Performed by: ORTHOPAEDIC SURGERY

## 2025-05-14 PROCEDURE — 99999 PR PBB SHADOW E&M-EST. PATIENT-LVL III: CPT | Mod: PBBFAC,,, | Performed by: ORTHOPAEDIC SURGERY

## 2025-05-14 PROCEDURE — 1159F MED LIST DOCD IN RCRD: CPT | Mod: CPTII,S$GLB,, | Performed by: ORTHOPAEDIC SURGERY

## 2025-05-14 PROCEDURE — 99204 OFFICE O/P NEW MOD 45 MIN: CPT | Mod: S$GLB,,, | Performed by: ORTHOPAEDIC SURGERY

## 2025-05-14 NOTE — PROGRESS NOTES
70 years old right knee pain for years off and on had a fall about a month ago exacerbate her symptoms.  Patient reportedly responded favorably to flex injection of the right knee in the past by myself     Exam shows tenderness the joint line no signs infection instability     X-rays show arthritic changes     Assessment: Right knee arthrosis     Plan: We will schedule the patient for Euflexxa injection of the right knee she did well with this in 2019, we will get this done once it is approved by insurance

## 2025-05-23 RX ORDER — APIXABAN 5 MG/1
5 TABLET, FILM COATED ORAL 2 TIMES DAILY
Qty: 60 TABLET | Refills: 11 | Status: SHIPPED | OUTPATIENT
Start: 2025-05-23

## 2025-06-16 ENCOUNTER — OFFICE VISIT (OUTPATIENT)
Dept: ORTHOPEDICS | Facility: CLINIC | Age: 70
End: 2025-06-16
Payer: COMMERCIAL

## 2025-06-16 DIAGNOSIS — M17.11 OSTEOARTHRITIS OF RIGHT KNEE, UNSPECIFIED OSTEOARTHRITIS TYPE: Primary | ICD-10-CM

## 2025-06-16 PROCEDURE — 99999 PR PBB SHADOW E&M-EST. PATIENT-LVL II: CPT | Mod: PBBFAC,,, | Performed by: ORTHOPAEDIC SURGERY

## 2025-06-16 PROCEDURE — 99213 OFFICE O/P EST LOW 20 MIN: CPT | Mod: 25,S$GLB,, | Performed by: ORTHOPAEDIC SURGERY

## 2025-06-16 PROCEDURE — 1125F AMNT PAIN NOTED PAIN PRSNT: CPT | Mod: CPTII,S$GLB,, | Performed by: ORTHOPAEDIC SURGERY

## 2025-06-16 PROCEDURE — 1159F MED LIST DOCD IN RCRD: CPT | Mod: CPTII,S$GLB,, | Performed by: ORTHOPAEDIC SURGERY

## 2025-06-16 PROCEDURE — 20610 DRAIN/INJ JOINT/BURSA W/O US: CPT | Mod: RT,S$GLB,, | Performed by: ORTHOPAEDIC SURGERY

## 2025-06-16 NOTE — PROCEDURES
Large Joint Aspiration/Injection: R knee    Date/Time: 6/16/2025 10:30 AM    Performed by: Dannie Jones MD  Authorized by: Dannie Jones MD    Consent Done?:  Yes (Verbal)  Timeout: prior to procedure the correct patient, procedure, and site was verified    Prep: patient was prepped and draped in usual sterile fashion      Details:  Needle Size:  21 G  Approach:  Anterolateral  Location:  Knee  Site:  R knee  Medications:  10 mg sodium hyaluronate (EUFLEXXA) 10 mg/mL(mw 2.4 -3.6 million)  Patient tolerance:  Patient tolerated the procedure well with no immediate complications

## 2025-06-16 NOTE — PROGRESS NOTES
70 years old right knee pain for years off and on had a fall about a month ago exacerbate her symptoms.  Patient reportedly responded favorably to Euflexxa injection of the right knee in the past by myself     Exam shows tenderness the joint line no signs infection instability     X-rays show arthritic changes     Assessment: Right knee arthrosis     Plan:  Euflexxa injection into the right knee

## 2025-06-20 RX ORDER — DICYCLOMINE HYDROCHLORIDE 10 MG/1
10 CAPSULE ORAL
Qty: 90 CAPSULE | Refills: 3 | Status: SHIPPED | OUTPATIENT
Start: 2025-06-20

## 2025-06-20 NOTE — TELEPHONE ENCOUNTER
No care due was identified.  [facility] Embedded Care Due Messages. Reference number: [id number].   6/20/2025 8:06:21 AM CDT

## 2025-06-25 ENCOUNTER — OFFICE VISIT (OUTPATIENT)
Dept: ORTHOPEDICS | Facility: CLINIC | Age: 70
End: 2025-06-25
Payer: COMMERCIAL

## 2025-06-25 DIAGNOSIS — M17.11 OSTEOARTHRITIS OF RIGHT KNEE, UNSPECIFIED OSTEOARTHRITIS TYPE: Primary | ICD-10-CM

## 2025-06-25 PROCEDURE — 99499 UNLISTED E&M SERVICE: CPT | Mod: S$GLB,,, | Performed by: ORTHOPAEDIC SURGERY

## 2025-06-25 PROCEDURE — 99999 PR PBB SHADOW E&M-EST. PATIENT-LVL I: CPT | Mod: PBBFAC,,, | Performed by: ORTHOPAEDIC SURGERY

## 2025-06-25 PROCEDURE — 20610 DRAIN/INJ JOINT/BURSA W/O US: CPT | Mod: RT,S$GLB,, | Performed by: ORTHOPAEDIC SURGERY

## 2025-06-25 NOTE — PROCEDURES
Large Joint Aspiration/Injection: R knee    Date/Time: 6/25/2025 9:15 AM    Performed by: Dannie Jones MD  Authorized by: Dannie Jones MD    Consent Done?:  Yes (Verbal)  Timeout: prior to procedure the correct patient, procedure, and site was verified    Prep: patient was prepped and draped in usual sterile fashion      Details:  Needle Size:  21 G  Approach:  Anterolateral  Location:  Knee  Site:  R knee  Medications:  10 mg sodium hyaluronate (EUFLEXXA) 10 mg/mL(mw 2.4 -3.6 million)  Patient tolerance:  Patient tolerated the procedure well with no immediate complications

## 2025-07-02 ENCOUNTER — OFFICE VISIT (OUTPATIENT)
Dept: ORTHOPEDICS | Facility: CLINIC | Age: 70
End: 2025-07-02
Payer: COMMERCIAL

## 2025-07-02 DIAGNOSIS — M17.11 OSTEOARTHRITIS OF RIGHT KNEE, UNSPECIFIED OSTEOARTHRITIS TYPE: Primary | ICD-10-CM

## 2025-07-02 PROCEDURE — 99999 PR PBB SHADOW E&M-EST. PATIENT-LVL II: CPT | Mod: PBBFAC,,, | Performed by: ORTHOPAEDIC SURGERY

## 2025-07-02 NOTE — PROCEDURES
Large Joint Aspiration/Injection: R knee    Date/Time: 7/2/2025 9:45 AM    Performed by: Dannie Jones MD  Authorized by: Dannie Jones MD    Consent Done?:  Yes (Verbal)  Timeout: prior to procedure the correct patient, procedure, and site was verified    Prep: patient was prepped and draped in usual sterile fashion      Details:  Needle Size:  21 G  Approach:  Anterolateral  Location:  Knee  Site:  R knee  Medications:  10 mg sodium hyaluronate (EUFLEXXA) 10 mg/mL(mw 2.4 -3.6 million)  Patient tolerance:  Patient tolerated the procedure well with no immediate complications

## 2025-08-08 RX ORDER — VENLAFAXINE HYDROCHLORIDE 75 MG/1
75 CAPSULE, EXTENDED RELEASE ORAL
Qty: 90 CAPSULE | Refills: 2 | Status: SHIPPED | OUTPATIENT
Start: 2025-08-08

## 2025-08-08 NOTE — TELEPHONE ENCOUNTER
Refill Decision Note   Charlene Jeffery  is requesting a refill authorization.  Brief Assessment and Rationale for Refill:  Approve     Medication Therapy Plan:        Comments:     Note composed:6:09 PM 08/08/2025

## 2025-08-18 ENCOUNTER — TELEPHONE (OUTPATIENT)
Dept: FAMILY MEDICINE | Facility: CLINIC | Age: 70
End: 2025-08-18
Payer: COMMERCIAL

## 2025-08-18 ENCOUNTER — OFFICE VISIT (OUTPATIENT)
Dept: ORTHOPEDICS | Facility: CLINIC | Age: 70
End: 2025-08-18
Payer: COMMERCIAL

## 2025-08-18 VITALS — WEIGHT: 255.31 LBS | BODY MASS INDEX: 45.24 KG/M2 | HEIGHT: 63 IN

## 2025-08-18 DIAGNOSIS — M17.11 OSTEOARTHRITIS OF RIGHT KNEE, UNSPECIFIED OSTEOARTHRITIS TYPE: Primary | ICD-10-CM

## 2025-08-18 PROCEDURE — 1125F AMNT PAIN NOTED PAIN PRSNT: CPT | Mod: CPTII,S$GLB,, | Performed by: ORTHOPAEDIC SURGERY

## 2025-08-18 PROCEDURE — 99203 OFFICE O/P NEW LOW 30 MIN: CPT | Mod: S$GLB,,, | Performed by: ORTHOPAEDIC SURGERY

## 2025-08-18 PROCEDURE — 3008F BODY MASS INDEX DOCD: CPT | Mod: CPTII,S$GLB,, | Performed by: ORTHOPAEDIC SURGERY

## 2025-08-18 PROCEDURE — 1159F MED LIST DOCD IN RCRD: CPT | Mod: CPTII,S$GLB,, | Performed by: ORTHOPAEDIC SURGERY

## 2025-08-18 PROCEDURE — 99999 PR PBB SHADOW E&M-EST. PATIENT-LVL III: CPT | Mod: PBBFAC,,, | Performed by: ORTHOPAEDIC SURGERY

## 2025-08-18 PROCEDURE — 1101F PT FALLS ASSESS-DOCD LE1/YR: CPT | Mod: CPTII,S$GLB,, | Performed by: ORTHOPAEDIC SURGERY

## 2025-08-18 PROCEDURE — 3288F FALL RISK ASSESSMENT DOCD: CPT | Mod: CPTII,S$GLB,, | Performed by: ORTHOPAEDIC SURGERY

## 2025-08-18 PROCEDURE — 1160F RVW MEDS BY RX/DR IN RCRD: CPT | Mod: CPTII,S$GLB,, | Performed by: ORTHOPAEDIC SURGERY

## 2025-08-27 ENCOUNTER — HOSPITAL ENCOUNTER (OUTPATIENT)
Dept: RADIOLOGY | Facility: HOSPITAL | Age: 70
Discharge: HOME | End: 2025-08-27
Attending: FAMILY MEDICINE
Payer: COMMERCIAL

## 2025-08-27 DIAGNOSIS — Z12.31 ENCOUNTER FOR SCREENING MAMMOGRAM FOR MALIGNANT NEOPLASM OF BREAST: ICD-10-CM

## 2025-08-27 PROCEDURE — 77063 BREAST TOMOSYNTHESIS BI: CPT | Mod: 26,,, | Performed by: RADIOLOGY

## 2025-08-27 PROCEDURE — 77067 SCR MAMMO BI INCL CAD: CPT | Mod: TC,PO

## 2025-08-27 PROCEDURE — 77067 SCR MAMMO BI INCL CAD: CPT | Mod: 26,,, | Performed by: RADIOLOGY
